# Patient Record
Sex: FEMALE | Race: BLACK OR AFRICAN AMERICAN | NOT HISPANIC OR LATINO | Employment: UNEMPLOYED | URBAN - METROPOLITAN AREA
[De-identification: names, ages, dates, MRNs, and addresses within clinical notes are randomized per-mention and may not be internally consistent; named-entity substitution may affect disease eponyms.]

---

## 2022-07-15 ENCOUNTER — HOSPITAL ENCOUNTER (EMERGENCY)
Facility: HOSPITAL | Age: 22
Discharge: HOME | End: 2022-07-15
Attending: EMERGENCY MEDICINE
Payer: MEDICAID

## 2022-07-15 VITALS
RESPIRATION RATE: 11 BRPM | OXYGEN SATURATION: 100 % | HEART RATE: 75 BPM | WEIGHT: 212.88 LBS | HEIGHT: 62 IN | TEMPERATURE: 100 F | DIASTOLIC BLOOD PRESSURE: 92 MMHG | SYSTOLIC BLOOD PRESSURE: 131 MMHG | BODY MASS INDEX: 39.17 KG/M2

## 2022-07-15 DIAGNOSIS — R55 SYNCOPE: ICD-10-CM

## 2022-07-15 DIAGNOSIS — R55 VASOVAGAL SYNCOPE: Primary | ICD-10-CM

## 2022-07-15 LAB
ALBUMIN SERPL BCP-MCNC: 4.1 G/DL (ref 3.5–5.2)
ALP SERPL-CCNC: 74 U/L (ref 55–135)
ALT SERPL W/O P-5'-P-CCNC: 44 U/L (ref 10–44)
AMPHET+METHAMPHET UR QL: NEGATIVE
ANION GAP SERPL CALC-SCNC: 15 MMOL/L (ref 8–16)
AST SERPL-CCNC: 16 U/L (ref 10–40)
B-HCG UR QL: NEGATIVE
BACTERIA #/AREA URNS AUTO: ABNORMAL /HPF
BARBITURATES UR QL SCN>200 NG/ML: NEGATIVE
BASOPHILS # BLD AUTO: 0.06 K/UL (ref 0–0.2)
BASOPHILS NFR BLD: 0.5 % (ref 0–1.9)
BENZODIAZ UR QL SCN>200 NG/ML: NEGATIVE
BILIRUB SERPL-MCNC: 0.6 MG/DL (ref 0.1–1)
BILIRUB UR QL STRIP: ABNORMAL
BUN SERPL-MCNC: 10 MG/DL (ref 6–20)
BZE UR QL SCN: NEGATIVE
CALCIUM SERPL-MCNC: 9.9 MG/DL (ref 8.7–10.5)
CANNABINOIDS UR QL SCN: ABNORMAL
CHLORIDE SERPL-SCNC: 102 MMOL/L (ref 95–110)
CLARITY UR REFRACT.AUTO: ABNORMAL
CO2 SERPL-SCNC: 21 MMOL/L (ref 23–29)
COLOR UR AUTO: ABNORMAL
CREAT SERPL-MCNC: 0.9 MG/DL (ref 0.5–1.4)
CREAT UR-MCNC: 141.9 MG/DL (ref 15–325)
CTP QC/QA: YES
CTP QC/QA: YES
DIFFERENTIAL METHOD: ABNORMAL
EOSINOPHIL # BLD AUTO: 0 K/UL (ref 0–0.5)
EOSINOPHIL NFR BLD: 0.3 % (ref 0–8)
ERYTHROCYTE [DISTWIDTH] IN BLOOD BY AUTOMATED COUNT: 16.8 % (ref 11.5–14.5)
EST. GFR  (AFRICAN AMERICAN): >60 ML/MIN/1.73 M^2
EST. GFR  (NON AFRICAN AMERICAN): >60 ML/MIN/1.73 M^2
GLUCOSE SERPL-MCNC: 95 MG/DL (ref 70–110)
GLUCOSE UR QL STRIP: NEGATIVE
HCT VFR BLD AUTO: 40.3 % (ref 37–48.5)
HGB BLD-MCNC: 12.8 G/DL (ref 12–16)
HGB UR QL STRIP: ABNORMAL
HYALINE CASTS UR QL AUTO: 0 /LPF
IMM GRANULOCYTES # BLD AUTO: 0.08 K/UL (ref 0–0.04)
IMM GRANULOCYTES NFR BLD AUTO: 0.7 % (ref 0–0.5)
KETONES UR QL STRIP: ABNORMAL
LEUKOCYTE ESTERASE UR QL STRIP: ABNORMAL
LIPASE SERPL-CCNC: 25 U/L (ref 4–60)
LYMPHOCYTES # BLD AUTO: 2.4 K/UL (ref 1–4.8)
LYMPHOCYTES NFR BLD: 21 % (ref 18–48)
MCH RBC QN AUTO: 24.8 PG (ref 27–31)
MCHC RBC AUTO-ENTMCNC: 31.8 G/DL (ref 32–36)
MCV RBC AUTO: 78 FL (ref 82–98)
METHADONE UR QL SCN>300 NG/ML: NEGATIVE
MICROSCOPIC COMMENT: ABNORMAL
MONOCYTES # BLD AUTO: 0.9 K/UL (ref 0.3–1)
MONOCYTES NFR BLD: 8.1 % (ref 4–15)
NEUTROPHILS # BLD AUTO: 7.9 K/UL (ref 1.8–7.7)
NEUTROPHILS NFR BLD: 69.4 % (ref 38–73)
NITRITE UR QL STRIP: NEGATIVE
NRBC BLD-RTO: 0 /100 WBC
OPIATES UR QL SCN: NEGATIVE
PCP UR QL SCN>25 NG/ML: NEGATIVE
PH UR STRIP: 6 [PH] (ref 5–8)
PLATELET # BLD AUTO: 605 K/UL (ref 150–450)
PMV BLD AUTO: 8.8 FL (ref 9.2–12.9)
POC MOLECULAR INFLUENZA A AGN: NEGATIVE
POC MOLECULAR INFLUENZA B AGN: NEGATIVE
POTASSIUM SERPL-SCNC: 3.2 MMOL/L (ref 3.5–5.1)
PROT SERPL-MCNC: 8.9 G/DL (ref 6–8.4)
PROT UR QL STRIP: ABNORMAL
RBC # BLD AUTO: 5.16 M/UL (ref 4–5.4)
RBC #/AREA URNS AUTO: >100 /HPF (ref 0–4)
SARS-COV-2 RDRP RESP QL NAA+PROBE: NEGATIVE
SODIUM SERPL-SCNC: 138 MMOL/L (ref 136–145)
SP GR UR STRIP: 1.01 (ref 1–1.03)
SQUAMOUS #/AREA URNS AUTO: 2 /HPF
TOXICOLOGY INFORMATION: ABNORMAL
TROPONIN I SERPL DL<=0.01 NG/ML-MCNC: 0.01 NG/ML (ref 0–0.03)
URN SPEC COLLECT METH UR: ABNORMAL
UROBILINOGEN UR STRIP-ACNC: NEGATIVE EU/DL
WBC # BLD AUTO: 11.45 K/UL (ref 3.9–12.7)
WBC #/AREA URNS AUTO: 12 /HPF (ref 0–5)

## 2022-07-15 PROCEDURE — 87086 URINE CULTURE/COLONY COUNT: CPT | Performed by: NURSE PRACTITIONER

## 2022-07-15 PROCEDURE — 93010 EKG 12-LEAD: ICD-10-PCS | Mod: ,,, | Performed by: INTERNAL MEDICINE

## 2022-07-15 PROCEDURE — 80053 COMPREHEN METABOLIC PANEL: CPT | Mod: ER | Performed by: NURSE PRACTITIONER

## 2022-07-15 PROCEDURE — 93010 ELECTROCARDIOGRAM REPORT: CPT | Mod: ,,, | Performed by: INTERNAL MEDICINE

## 2022-07-15 PROCEDURE — 87502 INFLUENZA DNA AMP PROBE: CPT | Mod: ER

## 2022-07-15 PROCEDURE — 80307 DRUG TEST PRSMV CHEM ANLYZR: CPT | Mod: ER | Performed by: NURSE PRACTITIONER

## 2022-07-15 PROCEDURE — 81025 URINE PREGNANCY TEST: CPT | Mod: ER | Performed by: NURSE PRACTITIONER

## 2022-07-15 PROCEDURE — 84484 ASSAY OF TROPONIN QUANT: CPT | Mod: ER | Performed by: NURSE PRACTITIONER

## 2022-07-15 PROCEDURE — 25000003 PHARM REV CODE 250: Mod: ER | Performed by: NURSE PRACTITIONER

## 2022-07-15 PROCEDURE — 83690 ASSAY OF LIPASE: CPT | Mod: ER | Performed by: NURSE PRACTITIONER

## 2022-07-15 PROCEDURE — U0002 COVID-19 LAB TEST NON-CDC: HCPCS | Mod: ER | Performed by: NURSE PRACTITIONER

## 2022-07-15 PROCEDURE — 93005 ELECTROCARDIOGRAM TRACING: CPT | Mod: ER

## 2022-07-15 PROCEDURE — 85025 COMPLETE CBC W/AUTO DIFF WBC: CPT | Mod: ER | Performed by: NURSE PRACTITIONER

## 2022-07-15 PROCEDURE — 81000 URINALYSIS NONAUTO W/SCOPE: CPT | Mod: ER | Performed by: NURSE PRACTITIONER

## 2022-07-15 PROCEDURE — 99285 EMERGENCY DEPT VISIT HI MDM: CPT | Mod: 25,ER

## 2022-07-15 PROCEDURE — 82962 GLUCOSE BLOOD TEST: CPT | Mod: ER

## 2022-07-15 RX ORDER — POTASSIUM CHLORIDE 20 MEQ/1
40 TABLET, EXTENDED RELEASE ORAL
Status: COMPLETED | OUTPATIENT
Start: 2022-07-15 | End: 2022-07-15

## 2022-07-15 RX ORDER — ONDANSETRON 8 MG/1
8 TABLET, ORALLY DISINTEGRATING ORAL EVERY 8 HOURS PRN
COMMUNITY
Start: 2022-07-11 | End: 2023-05-13

## 2022-07-15 RX ORDER — IBUPROFEN 200 MG
800 TABLET ORAL
Status: COMPLETED | OUTPATIENT
Start: 2022-07-15 | End: 2022-07-15

## 2022-07-15 RX ORDER — PROMETHAZINE HYDROCHLORIDE 25 MG/1
25 TABLET ORAL EVERY 6 HOURS PRN
COMMUNITY
Start: 2022-07-11 | End: 2023-05-13

## 2022-07-15 RX ADMIN — POTASSIUM CHLORIDE 40 MEQ: 1500 TABLET, EXTENDED RELEASE ORAL at 07:07

## 2022-07-15 RX ADMIN — IBUPROFEN 800 MG: 200 TABLET, FILM COATED ORAL at 06:07

## 2022-07-15 NOTE — ED PROVIDER NOTES
Encounter Date: 7/15/2022       History     Chief Complaint   Patient presents with    Loss of Consciousness     Felt overheated, blurry vision, passed out for 30 seconds. Hasn't been eating much recently due to stomach bug     Patient complains of nausea vomiting and weakness for 1 week states that tonight she was taking a shower and her vision became blurry and she passed out for 30 seconds.        Review of patient's allergies indicates:   Allergen Reactions    Shrimp      History reviewed. No pertinent past medical history.  History reviewed. No pertinent surgical history.  History reviewed. No pertinent family history.  Social History     Tobacco Use    Smoking status: Never Smoker    Smokeless tobacco: Never Used   Substance Use Topics    Alcohol use: Yes     Comment: occassionally    Drug use: Never     Review of Systems   Constitutional: Negative for fever.   HENT: Negative for sore throat.    Respiratory: Negative for shortness of breath.    Cardiovascular: Negative for chest pain.   Gastrointestinal: Negative for nausea.   Genitourinary: Negative for dysuria.   Musculoskeletal: Negative for back pain.   Skin: Negative for rash.   Neurological: Negative for weakness.   Hematological: Does not bruise/bleed easily.       Physical Exam     Initial Vitals [07/15/22 1658]   BP Pulse Resp Temp SpO2   132/87 93 16 99.8 °F (37.7 °C) 98 %      MAP       --         Physical Exam    Nursing note and vitals reviewed.  Constitutional: She appears well-developed and well-nourished. She is not diaphoretic. She is active.  Non-toxic appearance. No distress.   HENT:   Head: Normocephalic and atraumatic.   Eyes: Conjunctivae are normal. Right eye exhibits no discharge. Left eye exhibits no discharge. No scleral icterus.   Neck:   Normal range of motion.  Cardiovascular: Normal rate, regular rhythm and intact distal pulses.   No murmur heard.  Pulmonary/Chest: Breath sounds normal. No respiratory distress. She has no  wheezes.   Abdominal: She exhibits no distension.   Musculoskeletal:         General: No tenderness. Normal range of motion.      Cervical back: Normal range of motion.     Neurological: She is alert and oriented to person, place, and time. No cranial nerve deficit. GCS score is 15. GCS eye subscore is 4. GCS verbal subscore is 5. GCS motor subscore is 6.   Skin: Skin is warm and dry. Capillary refill takes less than 2 seconds. No rash noted.   Psychiatric: She has a normal mood and affect. Her behavior is normal. Judgment and thought content normal.         ED Course   Procedures  Labs Reviewed   CBC W/ AUTO DIFFERENTIAL - Abnormal; Notable for the following components:       Result Value    MCV 78 (*)     MCH 24.8 (*)     MCHC 31.8 (*)     RDW 16.8 (*)     Platelets 605 (*)     MPV 8.8 (*)     Immature Granulocytes 0.7 (*)     Gran # (ANC) 7.9 (*)     Immature Grans (Abs) 0.08 (*)     All other components within normal limits   COMPREHENSIVE METABOLIC PANEL - Abnormal; Notable for the following components:    Potassium 3.2 (*)     CO2 21 (*)     Total Protein 8.9 (*)     All other components within normal limits   URINALYSIS, REFLEX TO URINE CULTURE - Abnormal; Notable for the following components:    Color, UA Red (*)     Appearance, UA Cloudy (*)     Protein, UA 2+ (*)     Ketones, UA 1+ (*)     Bilirubin (UA) 1+ (*)     Occult Blood UA 3+ (*)     Leukocytes, UA 2+ (*)     All other components within normal limits    Narrative:     Specimen Source->Urine   DRUG SCREEN PANEL, URINE EMERGENCY - Abnormal; Notable for the following components:    THC Presumptive Positive (*)     All other components within normal limits    Narrative:     Specimen Source->Urine   URINALYSIS MICROSCOPIC - Abnormal; Notable for the following components:    RBC, UA >100 (*)     WBC, UA 12 (*)     All other components within normal limits    Narrative:     Specimen Source->Urine   CULTURE, URINE   TROPONIN I   PREGNANCY TEST, URINE RAPID     Narrative:     Specimen Source->Urine   LIPASE   SARS-COV-2 RDRP GENE    Narrative:     This test utilizes isothermal nucleic acid amplification   technology to detect the SARS-CoV-2 RdRp nucleic acid segment.   The analytical sensitivity (limit of detection) is 125 genome   equivalents/mL.   A POSITIVE result implies infection with the SARS-CoV-2 virus;   the patient is presumed to be contagious.     A NEGATIVE result means that SARS-CoV-2 nucleic acids are not   present above the limit of detection. A NEGATIVE result should be   treated as presumptive. It does not rule out the possibility of   COVID-19 and should not be the sole basis for treatment decisions.   If COVID-19 is strongly suspected based on clinical and exposure   history, re-testing using an alternate molecular assay should be   considered.   This test is only for use under the Food and Drug   Administration s Emergency Use Authorization (EUA).   Commercial kits are provided by [employer].   Performance characteristics of the EUA have been independently   verified by [facility] Department of   Pathology and Laboratory Medicine.   _________________________________________________________________   The authorized Fact Sheet for Healthcare Providers and the authorized Fact   Sheet for Patients of the ID NOW COVID-19 are available on the FDA   website:     https://www.fda.gov/media/983274/download  https://www.fda.gov/media/601303/download           POCT INFLUENZA A/B MOLECULAR     EKG Readings: (Independently Interpreted)   Initial Reading: No STEMI. Rhythm: Normal Sinus Rhythm. Heart Rate: 81. Ectopy: No Ectopy. Conduction: Normal. ST Segments: Normal ST Segments. T Waves: Normal. Clinical Impression: Normal Sinus Rhythm       Imaging Results          X-Ray Chest AP Portable (Final result)  Result time 07/15/22 20:05:35    Final result by Brandon Maldonado MD (07/15/22 20:05:35)                 Impression:      No acute  abnormality.      Electronically signed by: Brandon Maldonado  Date:    07/15/2022  Time:    20:05             Narrative:    EXAMINATION:  XR CHEST AP PORTABLE    CLINICAL HISTORY:  syncope;    TECHNIQUE:  Single frontal view of the chest was performed.    COMPARISON:  None    FINDINGS:  The lungs are clear, with normal appearance of pulmonary vasculature and no pleural effusion or pneumothorax.    The cardiac silhouette is normal in size. The hilar and mediastinal contours are unremarkable.    Bones are intact.                                 Medications   ibuprofen tablet 800 mg (800 mg Oral Given 7/15/22 1854)   potassium chloride SA CR tablet 40 mEq (40 mEq Oral Given 7/15/22 1912)                          Clinical Impression:   Final diagnoses:  [R55] Syncope  [R55] Vasovagal syncope (Primary)          ED Disposition Condition    Discharge Stable        ED Prescriptions     None        Follow-up Information     Follow up With Specialties Details Why Contact Info Additional Information    [facility] Schedule an appointment as soon as possible for a visit  As needed [id number] [facility] [id number] [phone] Please park on the [address] side and use the Clinic entrance. Check in on the 3rd floor, to the right.           Dino Ceballos NP  07/15/22 2013

## 2022-07-16 LAB — POCT GLUCOSE: 90 MG/DL (ref 70–110)

## 2022-07-17 LAB
BACTERIA UR CULT: NORMAL
BACTERIA UR CULT: NORMAL

## 2022-08-14 ENCOUNTER — HOSPITAL ENCOUNTER (EMERGENCY)
Facility: HOSPITAL | Age: 22
Discharge: HOME | End: 2022-08-14
Attending: EMERGENCY MEDICINE
Payer: MEDICAID

## 2022-08-14 VITALS
WEIGHT: 218 LBS | TEMPERATURE: 98 F | HEART RATE: 81 BPM | BODY MASS INDEX: 39.87 KG/M2 | DIASTOLIC BLOOD PRESSURE: 81 MMHG | RESPIRATION RATE: 18 BRPM | SYSTOLIC BLOOD PRESSURE: 141 MMHG | OXYGEN SATURATION: 100 %

## 2022-08-14 DIAGNOSIS — R11.2 NON-INTRACTABLE VOMITING WITH NAUSEA, UNSPECIFIED VOMITING TYPE: Primary | ICD-10-CM

## 2022-08-14 DIAGNOSIS — B34.9 NONSPECIFIC SYNDROME SUGGESTIVE OF VIRAL ILLNESS: ICD-10-CM

## 2022-08-14 DIAGNOSIS — R10.13 ACUTE EPIGASTRIC PAIN: ICD-10-CM

## 2022-08-14 LAB
ALBUMIN SERPL BCP-MCNC: 3.8 G/DL (ref 3.5–5.2)
ALP SERPL-CCNC: 67 U/L (ref 55–135)
ALT SERPL W/O P-5'-P-CCNC: 12 U/L (ref 10–44)
ANION GAP SERPL CALC-SCNC: 12 MMOL/L (ref 8–16)
AST SERPL-CCNC: 12 U/L (ref 10–40)
B-HCG UR QL: NEGATIVE
BACTERIA #/AREA URNS AUTO: ABNORMAL /HPF
BASOPHILS # BLD AUTO: 0.04 K/UL (ref 0–0.2)
BASOPHILS NFR BLD: 0.5 % (ref 0–1.9)
BILIRUB SERPL-MCNC: 0.2 MG/DL (ref 0.1–1)
BILIRUB UR QL STRIP: NEGATIVE
BUN SERPL-MCNC: 7 MG/DL (ref 6–20)
CALCIUM SERPL-MCNC: 9.7 MG/DL (ref 8.7–10.5)
CHLORIDE SERPL-SCNC: 109 MMOL/L (ref 95–110)
CLARITY UR REFRACT.AUTO: ABNORMAL
CO2 SERPL-SCNC: 20 MMOL/L (ref 23–29)
COLOR UR AUTO: ABNORMAL
CREAT SERPL-MCNC: 0.7 MG/DL (ref 0.5–1.4)
DIFFERENTIAL METHOD: ABNORMAL
EOSINOPHIL # BLD AUTO: 0.1 K/UL (ref 0–0.5)
EOSINOPHIL NFR BLD: 1.5 % (ref 0–8)
ERYTHROCYTE [DISTWIDTH] IN BLOOD BY AUTOMATED COUNT: 16.4 % (ref 11.5–14.5)
EST. GFR  (NO RACE VARIABLE): >60 ML/MIN/1.73 M^2
GLUCOSE SERPL-MCNC: 134 MG/DL (ref 70–110)
GLUCOSE UR QL STRIP: NEGATIVE
HCT VFR BLD AUTO: 37 % (ref 37–48.5)
HGB BLD-MCNC: 11.6 G/DL (ref 12–16)
HGB UR QL STRIP: ABNORMAL
HYALINE CASTS UR QL AUTO: 0 /LPF
IMM GRANULOCYTES # BLD AUTO: 0.03 K/UL (ref 0–0.04)
IMM GRANULOCYTES NFR BLD AUTO: 0.4 % (ref 0–0.5)
KETONES UR QL STRIP: NEGATIVE
LEUKOCYTE ESTERASE UR QL STRIP: ABNORMAL
LIPASE SERPL-CCNC: 30 U/L (ref 4–60)
LYMPHOCYTES # BLD AUTO: 1.5 K/UL (ref 1–4.8)
LYMPHOCYTES NFR BLD: 18.8 % (ref 18–48)
MCH RBC QN AUTO: 25.2 PG (ref 27–31)
MCHC RBC AUTO-ENTMCNC: 31.4 G/DL (ref 32–36)
MCV RBC AUTO: 80 FL (ref 82–98)
MICROSCOPIC COMMENT: ABNORMAL
MONOCYTES # BLD AUTO: 0.5 K/UL (ref 0.3–1)
MONOCYTES NFR BLD: 5.8 % (ref 4–15)
NEUTROPHILS # BLD AUTO: 5.8 K/UL (ref 1.8–7.7)
NEUTROPHILS NFR BLD: 73 % (ref 38–73)
NITRITE UR QL STRIP: NEGATIVE
NRBC BLD-RTO: 0 /100 WBC
PH UR STRIP: 7 [PH] (ref 5–8)
PLATELET # BLD AUTO: 630 K/UL (ref 150–450)
PMV BLD AUTO: 8.9 FL (ref 9.2–12.9)
POTASSIUM SERPL-SCNC: 3.3 MMOL/L (ref 3.5–5.1)
PROT SERPL-MCNC: 8.3 G/DL (ref 6–8.4)
PROT UR QL STRIP: ABNORMAL
RBC # BLD AUTO: 4.6 M/UL (ref 4–5.4)
RBC #/AREA URNS AUTO: >100 /HPF (ref 0–4)
SODIUM SERPL-SCNC: 141 MMOL/L (ref 136–145)
SP GR UR STRIP: 1.01 (ref 1–1.03)
SQUAMOUS #/AREA URNS AUTO: 3 /HPF
URN SPEC COLLECT METH UR: ABNORMAL
UROBILINOGEN UR STRIP-ACNC: NEGATIVE EU/DL
WBC # BLD AUTO: 7.97 K/UL (ref 3.9–12.7)
WBC #/AREA URNS AUTO: 1 /HPF (ref 0–5)

## 2022-08-14 PROCEDURE — 25000003 PHARM REV CODE 250: Mod: ER | Performed by: NURSE PRACTITIONER

## 2022-08-14 PROCEDURE — 96365 THER/PROPH/DIAG IV INF INIT: CPT | Mod: ER

## 2022-08-14 PROCEDURE — 99285 EMERGENCY DEPT VISIT HI MDM: CPT | Mod: 25,ER

## 2022-08-14 PROCEDURE — 63600175 PHARM REV CODE 636 W HCPCS: Mod: ER | Performed by: NURSE PRACTITIONER

## 2022-08-14 PROCEDURE — 96375 TX/PRO/DX INJ NEW DRUG ADDON: CPT | Mod: ER

## 2022-08-14 PROCEDURE — 85025 COMPLETE CBC W/AUTO DIFF WBC: CPT | Mod: ER | Performed by: NURSE PRACTITIONER

## 2022-08-14 PROCEDURE — 83690 ASSAY OF LIPASE: CPT | Performed by: NURSE PRACTITIONER

## 2022-08-14 PROCEDURE — 80053 COMPREHEN METABOLIC PANEL: CPT | Performed by: NURSE PRACTITIONER

## 2022-08-14 PROCEDURE — 81025 URINE PREGNANCY TEST: CPT | Mod: ER | Performed by: NURSE PRACTITIONER

## 2022-08-14 PROCEDURE — 81000 URINALYSIS NONAUTO W/SCOPE: CPT | Mod: ER | Performed by: NURSE PRACTITIONER

## 2022-08-14 RX ORDER — METOCLOPRAMIDE HYDROCHLORIDE 5 MG/ML
10 INJECTION INTRAMUSCULAR; INTRAVENOUS
Status: COMPLETED | OUTPATIENT
Start: 2022-08-14 | End: 2022-08-14

## 2022-08-14 RX ORDER — ACETAMINOPHEN 500 MG
1000 TABLET ORAL EVERY 6 HOURS PRN
Qty: 40 TABLET | Refills: 0 | Status: SHIPPED | OUTPATIENT
Start: 2022-08-14 | End: 2022-09-13

## 2022-08-14 RX ORDER — DIPHENHYDRAMINE HYDROCHLORIDE 50 MG/ML
12.5 INJECTION INTRAMUSCULAR; INTRAVENOUS
Status: COMPLETED | OUTPATIENT
Start: 2022-08-14 | End: 2022-08-14

## 2022-08-14 RX ORDER — ONDANSETRON 4 MG/1
4 TABLET, ORALLY DISINTEGRATING ORAL EVERY 8 HOURS PRN
Qty: 18 TABLET | Refills: 0 | OUTPATIENT
Start: 2022-08-14 | End: 2023-05-13

## 2022-08-14 RX ORDER — KETOROLAC TROMETHAMINE 30 MG/ML
15 INJECTION, SOLUTION INTRAMUSCULAR; INTRAVENOUS
Status: COMPLETED | OUTPATIENT
Start: 2022-08-14 | End: 2022-08-14

## 2022-08-14 RX ADMIN — PROMETHAZINE HYDROCHLORIDE 12.5 MG: 25 INJECTION INTRAMUSCULAR; INTRAVENOUS at 05:08

## 2022-08-14 RX ADMIN — METOCLOPRAMIDE 10 MG: 5 INJECTION, SOLUTION INTRAMUSCULAR; INTRAVENOUS at 06:08

## 2022-08-14 RX ADMIN — KETOROLAC TROMETHAMINE 15 MG: 30 INJECTION, SOLUTION INTRAMUSCULAR; INTRAVENOUS at 07:08

## 2022-08-14 RX ADMIN — DIPHENHYDRAMINE HYDROCHLORIDE 12.5 MG: 50 INJECTION, SOLUTION INTRAMUSCULAR; INTRAVENOUS at 06:08

## 2022-08-14 RX ADMIN — SODIUM CHLORIDE 1000 ML: 0.9 INJECTION, SOLUTION INTRAVENOUS at 05:08

## 2022-08-15 NOTE — ED PROVIDER NOTES
HISTORY     Chief Complaint   Patient presents with    Abdominal Pain     Abdominal pain and vomiting began around 3pm today     Review of patient's allergies indicates:   Allergen Reactions    Shrimp         HPI   The history is provided by the patient. No  was used.   Abdominal Pain  The current episode started today. The onset of the illness was gradual. The problem has not changed since onset.Pain location: Mid upper abdomen. The severity of the abdominal pain is 4/10. The other symptoms of the illness include nausea, vomiting and vaginal bleeding (Reports recently started cycle). The other symptoms of the illness do not include fever, fatigue, jaundice, shortness of breath, diarrhea, dysuria, hematemesis, hematochezia or vaginal discharge.   Symptoms associated with the illness do not include back pain.        PCP: Primary Doctor No     Past Medical History:  History reviewed. No pertinent past medical history.     Past Surgical History:  History reviewed. No pertinent surgical history.     Family History:  History reviewed. No pertinent family history.     Social History:  Social History     Tobacco Use    Smoking status: Never Smoker    Smokeless tobacco: Never Used   Substance and Sexual Activity    Alcohol use: Yes     Comment: occassionally    Drug use: Never    Sexual activity: Not on file         ROS   Review of Systems   Constitutional: Negative for fatigue and fever.   HENT: Negative for sore throat.    Respiratory: Negative for shortness of breath.    Cardiovascular: Negative for chest pain.   Gastrointestinal: Positive for abdominal pain, nausea and vomiting. Negative for diarrhea, hematemesis, hematochezia and jaundice.   Genitourinary: Positive for vaginal bleeding (Reports recently started cycle). Negative for dysuria and vaginal discharge.   Musculoskeletal: Negative for back pain.   Skin: Negative for rash.   Neurological: Negative for weakness.   Hematological:  Does not bruise/bleed easily.       PHYSICAL EXAM     Initial Vitals [08/14/22 1711]   BP Pulse Resp Temp SpO2   (!) 168/103 77 16 97.7 °F (36.5 °C) 98 %      MAP       --           Physical Exam    Nursing note and vitals reviewed.  Constitutional: She appears well-developed and well-nourished. She is not diaphoretic. No distress.   HENT:   Head: Normocephalic and atraumatic.   Eyes: Right eye exhibits no discharge. Left eye exhibits no discharge.   Neck: Neck supple.   Normal range of motion.  Cardiovascular: Normal rate.   Pulmonary/Chest: No respiratory distress.   Abdominal: Abdomen is soft. She exhibits no distension. There is abdominal tenderness (Mid upper abdomen).   Musculoskeletal:         General: Normal range of motion.      Cervical back: Normal range of motion and neck supple.     Neurological: She is alert and oriented to person, place, and time. She has normal strength.   Skin: Skin is warm and dry.   Psychiatric: She has a normal mood and affect. Her behavior is normal. Thought content normal.          ED COURSE   Procedures  ED ONGOING VITALS:  Vitals:    08/14/22 1711 08/14/22 1837 08/14/22 2019   BP: (!) 168/103 (!) 159/89 (!) 141/81   Pulse: 77 78 81   Resp: 16 19 18   Temp: 97.7 °F (36.5 °C)     TempSrc: Oral     SpO2: 98% 100% 100%   Weight: 98.9 kg (218 lb)           ABNORMAL LAB VALUES:  Labs Reviewed   CBC W/ AUTO DIFFERENTIAL - Abnormal; Notable for the following components:       Result Value    Hemoglobin 11.6 (*)     MCV 80 (*)     MCH 25.2 (*)     MCHC 31.4 (*)     RDW 16.4 (*)     Platelets 630 (*)     MPV 8.9 (*)     All other components within normal limits   COMPREHENSIVE METABOLIC PANEL - Abnormal; Notable for the following components:    Potassium 3.3 (*)     CO2 20 (*)     Glucose 134 (*)     All other components within normal limits   URINALYSIS, REFLEX TO URINE CULTURE - Abnormal; Notable for the following components:    Color, UA Red (*)     Appearance, UA Hazy (*)      Protein, UA 1+ (*)     Occult Blood UA 3+ (*)     Leukocytes, UA Trace (*)     All other components within normal limits    Narrative:     Specimen Source->Urine   URINALYSIS MICROSCOPIC - Abnormal; Notable for the following components:    RBC, UA >100 (*)     All other components within normal limits    Narrative:     Specimen Source->Urine   LIPASE   PREGNANCY TEST, URINE RAPID    Narrative:     Specimen Source->Urine         ALL LAB VALUES:        RADIOLOGY STUDIES:  Imaging Results          CT Abdomen Pelvis  Without Contrast (Final result)  Result time 08/14/22 18:25:33    Final result by Brandon Maldonado MD (08/14/22 18:25:33)                 Impression:      Questionable minimal peripancreatic inflammatory stranding.  Correlation lipase.    No definite acute abnormality identified otherwise.  Correlation to history and further evaluation as warranted.    All CT scans at this facility are performed  using dose modulation techniques as appropriate to performed exam including the following:  automated exposure control; adjustment of mA and/or kV according to the patients size (this includes techniques or standardized protocols for targeted exams where dose is matched to indication/reason for exam: i.e. extremities or head);  iterative reconstruction technique.      Electronically signed by: Brandon Maldonado  Date:    08/14/2022  Time:    18:25             Narrative:    EXAMINATION:  CT ABDOMEN PELVIS WITHOUT CONTRAST    CLINICAL HISTORY:  upper abdominal pain and tenderness;    TECHNIQUE:  Low dose axial images, sagittal and coronal reformations were obtained from the lung bases to the pubic symphysis.  Contrast was not administered.    COMPARISON:  None    FINDINGS:  Heart: Normal in size. No pericardial effusion.    Lung Bases: Well aerated, without consolidation or pleural fluid.    Liver: Normal in size and attenuation, with no focal hepatic lesions.    Gallbladder: No calcified gallstones.    Bile Ducts: No  evidence of dilated ducts.    Pancreas: Questionable minimal peripancreatic inflammatory stranding.  Correlation lipase recommended.    Spleen: Unremarkable.    Adrenals: Unremarkable.    Kidneys/ Ureters: Unremarkable.    Bladder: No evidence of wall thickening.    Reproductive organs: Unremarkable.    GI Tract/Mesentery: No evidence of bowel obstruction or inflammation.  No evidence of appendicitis.    Peritoneal Space: No ascites. No free air.    Retroperitoneum: No significant adenopathy.    Abdominal wall: Unremarkable.    Vasculature: No significant atherosclerosis or aneurysm.    Bones: No acute fracture.                                          The above vital signs and test results have been reviewed by the emergency provider.     ED Medications:  Current Discharge Medication List        Discharge Medications:  New Prescriptions    ACETAMINOPHEN (TYLENOL) 500 MG TABLET    Take 2 tablets (1,000 mg total) by mouth every 6 (six) hours as needed for Pain. Do not exceed 6 tablets within 24 hours.    ONDANSETRON (ZOFRAN-ODT) 4 MG TBDL    Take 1 tablet (4 mg total) by mouth every 8 (eight) hours as needed (nausea/vomiting).       Follow-up Information     Schedule an appointment as soon as possible for a visit  with with your primary care physician.           [facility] Emergency Dept.    Specialty: Emergency Medicine  Why: As needed, If symptoms worsen  Contact information:  [address]  [facility] [address] [phone]                      I discussed with patient and/or family/caretaker that evaluation in the ED does not suggest any emergent or life threatening medical conditions requiring immediate intervention beyond what was provided in the ED, and I believe patient is safe for discharge. Regardless, an unremarkable evaluation in the ED does not preclude the development or presence of a serious or life threatening condition. As such, patient was instructed to return immediately for any  worsening or change in current symptoms.    Pre-hypertension/Hypertension: The pt has been informed that they may have pre-hypertension or hypertension based on a blood pressure reading in the ED. I recommend that the pt call the PCP listed on their discharge instructions or a physician of their choice this week to arrange f/u for further evaluation of possible pre-hypertension or hypertension.       MEDICAL DECISION MAKING                 CLINICAL IMPRESSION       ICD-10-CM ICD-9-CM   1. Non-intractable vomiting with nausea, unspecified vomiting type  R11.2 787.01   2. Nonspecific syndrome suggestive of viral illness  B34.9 079.99   3. Acute epigastric pain  R10.13 789.06     338.19               Bryan Sparrow MD  08/14/22 2025

## 2023-05-13 ENCOUNTER — HOSPITAL ENCOUNTER (EMERGENCY)
Facility: HOSPITAL | Age: 23
Discharge: HOME | End: 2023-05-13
Attending: EMERGENCY MEDICINE
Payer: MEDICAID

## 2023-05-13 VITALS
HEART RATE: 78 BPM | DIASTOLIC BLOOD PRESSURE: 74 MMHG | WEIGHT: 201 LBS | TEMPERATURE: 99 F | RESPIRATION RATE: 20 BRPM | OXYGEN SATURATION: 99 % | SYSTOLIC BLOOD PRESSURE: 121 MMHG | BODY MASS INDEX: 36.76 KG/M2

## 2023-05-13 DIAGNOSIS — S93.401A RIGHT ANKLE SPRAIN: ICD-10-CM

## 2023-05-13 LAB — B-HCG UR QL: NEGATIVE

## 2023-05-13 PROCEDURE — 99283 EMERGENCY DEPT VISIT LOW MDM: CPT | Mod: ER

## 2023-05-13 PROCEDURE — 81025 URINE PREGNANCY TEST: CPT | Mod: ER | Performed by: EMERGENCY MEDICINE

## 2023-05-13 RX ORDER — NAPROXEN 500 MG/1
500 TABLET ORAL 2 TIMES DAILY WITH MEALS
Qty: 20 TABLET | Refills: 0 | Status: SHIPPED | OUTPATIENT
Start: 2023-05-13 | End: 2024-01-18

## 2023-05-13 RX ORDER — PANTOPRAZOLE SODIUM 40 MG/1
40 TABLET, DELAYED RELEASE ORAL EVERY MORNING
COMMUNITY
Start: 2023-04-18

## 2023-05-17 NOTE — ED PROVIDER NOTES
ED Provider Note - 5/13/2023    History     Chief Complaint   Patient presents with    Ankle Injury     Right ankle, injured walking up steps     Patient currently presents with a chief complaint of injury to the right ankle.  This was acquired just PTA as a result of twisting the ankle while walking up steps.  Patient notes associated symptoms of pain and swelling.  Denies associated deformity, abrasion, laceration, and loss of ROM.    Review of patient's allergies indicates:   Allergen Reactions    Shrimp      No past medical history on file.  No past surgical history on file.  No family history on file.  Social History     Tobacco Use    Smoking status: Never    Smokeless tobacco: Never   Substance Use Topics    Alcohol use: Yes     Comment: occassionally    Drug use: Never     Review of Systems   Constitutional:  Negative for chills and fever.   HENT:  Negative for congestion and rhinorrhea.    Respiratory:  Negative for cough and shortness of breath.    Cardiovascular:  Negative for chest pain and palpitations.   Gastrointestinal:  Negative for abdominal pain, diarrhea and vomiting.   Genitourinary:  Negative for difficulty urinating and dysuria.   Skin:  Negative for color change and rash.   Neurological:  Negative for dizziness and light-headedness.   Hematological:  Negative for adenopathy. Does not bruise/bleed easily.     Physical Exam     Initial Vitals [05/13/23 1845]   BP Pulse Resp Temp SpO2   121/74 78 20 98.8 °F (37.1 °C) 99 %      MAP       --         Physical Exam    Nursing note and vitals reviewed.  Constitutional: She appears well-developed and well-nourished. She is not diaphoretic. No distress.   HENT:   Head: Normocephalic and atraumatic.   Nose: Nose normal.   Mouth/Throat: Oropharynx is clear and moist.   Eyes: Conjunctivae are normal. No scleral icterus.   Cardiovascular:  Normal rate, regular rhythm, normal heart sounds and intact distal pulses.           Pulmonary/Chest: Breath sounds  normal. No respiratory distress.   Abdominal: Abdomen is soft. She exhibits no distension. There is no abdominal tenderness.   Musculoskeletal:         General: No edema. Normal range of motion.      Right ankle: Swelling present. No deformity. Tenderness present. Normal range of motion. Normal pulse.      Right Achilles Tendon: Normal.     Neurological: She is alert and oriented to person, place, and time. She has normal strength.   Skin: Skin is warm and dry.       ED Course   Procedures                   MDM  Differential Diagnoses   Based on available history, the working differential diagnoses considered during this evaluation include but are not limited to sprain/strain, contusion, fracture, dislocation.      LABS   Labs Reviewed - No data to display      Imaging     Imaging Results              X-Ray Ankle Complete Right (Final result)  Result time 05/13/23 19:56:07      Final result by Van Duarte MD (05/13/23 19:56:07)                   Impression:      No acute fracture or dislocation      Electronically signed by: Van Duarte  Date:    05/13/2023  Time:    19:56               Narrative:    EXAMINATION:  XR ANKLE COMPLETE 3 VIEW RIGHT    CLINICAL HISTORY:  Sprain of unspecified ligament of right ankle, initial encounter    TECHNIQUE:  AP, lateral, and oblique images of the right ankle were performed.    COMPARISON:  None    FINDINGS:  No acute fracture or dislocation.  Normal joint spaces.  Mild ankle soft tissue swelling.  Punctate density at the calcaneal navicular junction may relate to degenerative joint disease.                                    X-Rays:   Independently Interpreted Readings:   Other Readings:  X-ray of the left ankle shows no evidence for fracture or dislocation    EKG        ED Management/Discussion   Medications - No data to display                The patient's list of active medical problems, social history, medications, and allergies as documented per RN staff has been  reviewed.                 On final assessment, the patient appears well and comfortable for discharge.  I see no indication of an emergent process beyond that addressed during our encounter but have duly counseled the patient/family regarding the need for prompt follow-up as well as the indications that should prompt immediate return to the emergency room should new or worrisome developments occur.  The patient/family has been provided with verbal and printed direction regarding our final diagnosis(es) as well as instructions regarding use of OTC and/or Rx medications intended to manage the patient's aforementioned conditions including:  ED Prescriptions       Medication Sig Dispense Start Date End Date Auth. Provider    naproxen (NAPROSYN) 500 MG tablet Take 1 tablet (500 mg total) by mouth 2 (two) times daily with meals. 20 tablet 5/13/2023 -- Jag Larios MD              Patient has been advised of the following recommended follow-up instructions:  Follow-up Information       Follow up With Specialties Details Why Contact Info    PCP  Schedule an appointment as soon as possible for a visit  for reassessment     [place] - Emergency Dept Emergency Medicine Go to  As needed, If symptoms worsen [address] [address] [id number] [phone]          The patient/family communicates understanding of all this information and all remaining questions and concerns were addressed at this time.      Referrals:  No orders of the defined types were placed in this encounter.      CLINICAL IMPRESSION    ICD-10-CM ICD-9-CM   1. Right ankle sprain  S93.401A 845.00          ED Disposition Condition    Discharge Stable               Jag Larios MD  05/17/23 3639

## 2023-09-10 ENCOUNTER — HOSPITAL ENCOUNTER (EMERGENCY)
Facility: HOSPITAL | Age: 23
Discharge: HOME | End: 2023-09-11
Attending: EMERGENCY MEDICINE
Payer: MEDICAID

## 2023-09-10 DIAGNOSIS — R10.13 EPIGASTRIC PAIN: ICD-10-CM

## 2023-09-10 DIAGNOSIS — R11.2 NAUSEA AND VOMITING, UNSPECIFIED VOMITING TYPE: Primary | ICD-10-CM

## 2023-09-10 RX ORDER — PROCHLORPERAZINE EDISYLATE 5 MG/ML
10 INJECTION INTRAMUSCULAR; INTRAVENOUS
Status: COMPLETED | OUTPATIENT
Start: 2023-09-11 | End: 2023-09-11

## 2023-09-10 RX ORDER — KETOROLAC TROMETHAMINE 30 MG/ML
10 INJECTION, SOLUTION INTRAMUSCULAR; INTRAVENOUS
Status: COMPLETED | OUTPATIENT
Start: 2023-09-11 | End: 2023-09-11

## 2023-09-10 NOTE — Clinical Note
FAUZIA NETTLES accompanied their child to the emergency department on 9/10/2023. They may return to work on 09/12/2023.      If you have any questions or concerns, please don't hesitate to call.      DANIAL PEARSON RN

## 2023-09-11 VITALS
OXYGEN SATURATION: 100 % | RESPIRATION RATE: 16 BRPM | SYSTOLIC BLOOD PRESSURE: 109 MMHG | TEMPERATURE: 98 F | BODY MASS INDEX: 35.67 KG/M2 | HEART RATE: 78 BPM | DIASTOLIC BLOOD PRESSURE: 69 MMHG | WEIGHT: 195 LBS

## 2023-09-11 LAB
ALBUMIN SERPL BCP-MCNC: 4.1 G/DL (ref 3.5–5.2)
ALP SERPL-CCNC: 68 U/L (ref 55–135)
ALT SERPL W/O P-5'-P-CCNC: 12 U/L (ref 10–44)
ANION GAP SERPL CALC-SCNC: 10 MMOL/L (ref 8–16)
ANION GAP SERPL CALC-SCNC: 15 MMOL/L (ref 8–16)
AST SERPL-CCNC: 13 U/L (ref 10–40)
B-HCG UR QL: NEGATIVE
BACTERIA #/AREA URNS AUTO: ABNORMAL /HPF
BASOPHILS # BLD AUTO: 0.02 K/UL (ref 0–0.2)
BASOPHILS NFR BLD: 0.3 % (ref 0–1.9)
BILIRUB SERPL-MCNC: 0.5 MG/DL (ref 0.1–1)
BILIRUB UR QL STRIP: ABNORMAL
BUN SERPL-MCNC: 10 MG/DL (ref 6–20)
BUN SERPL-MCNC: 9 MG/DL (ref 6–20)
CALCIUM SERPL-MCNC: 7.9 MG/DL (ref 8.7–10.5)
CALCIUM SERPL-MCNC: 9.9 MG/DL (ref 8.7–10.5)
CHLORIDE SERPL-SCNC: 109 MMOL/L (ref 95–110)
CHLORIDE SERPL-SCNC: 113 MMOL/L (ref 95–110)
CLARITY UR REFRACT.AUTO: ABNORMAL
CO2 SERPL-SCNC: 16 MMOL/L (ref 23–29)
CO2 SERPL-SCNC: 16 MMOL/L (ref 23–29)
COLOR UR AUTO: YELLOW
CREAT SERPL-MCNC: 0.6 MG/DL (ref 0.5–1.4)
CREAT SERPL-MCNC: 0.7 MG/DL (ref 0.5–1.4)
DIFFERENTIAL METHOD: ABNORMAL
EOSINOPHIL # BLD AUTO: 0 K/UL (ref 0–0.5)
EOSINOPHIL NFR BLD: 0.1 % (ref 0–8)
ERYTHROCYTE [DISTWIDTH] IN BLOOD BY AUTOMATED COUNT: 17 % (ref 11.5–14.5)
EST. GFR  (NO RACE VARIABLE): >60 ML/MIN/1.73 M^2
EST. GFR  (NO RACE VARIABLE): >60 ML/MIN/1.73 M^2
GLUCOSE SERPL-MCNC: 101 MG/DL (ref 70–110)
GLUCOSE SERPL-MCNC: 128 MG/DL (ref 70–110)
GLUCOSE UR QL STRIP: NEGATIVE
HCG INTACT+B SERPL-ACNC: <1.2 MIU/ML
HCT VFR BLD AUTO: 33.5 % (ref 37–48.5)
HGB BLD-MCNC: 10.3 G/DL (ref 12–16)
HGB UR QL STRIP: ABNORMAL
HYALINE CASTS UR QL AUTO: 0 /LPF
IMM GRANULOCYTES # BLD AUTO: 0.12 K/UL (ref 0–0.04)
IMM GRANULOCYTES NFR BLD AUTO: 1.6 % (ref 0–0.5)
KETONES UR QL STRIP: ABNORMAL
LEUKOCYTE ESTERASE UR QL STRIP: NEGATIVE
LIPASE SERPL-CCNC: 3 U/L (ref 4–60)
LYMPHOCYTES # BLD AUTO: 1.1 K/UL (ref 1–4.8)
LYMPHOCYTES NFR BLD: 14.7 % (ref 18–48)
MCH RBC QN AUTO: 23.6 PG (ref 27–31)
MCHC RBC AUTO-ENTMCNC: 30.7 G/DL (ref 32–36)
MCV RBC AUTO: 77 FL (ref 82–98)
MICROSCOPIC COMMENT: ABNORMAL
MONOCYTES # BLD AUTO: 0.3 K/UL (ref 0.3–1)
MONOCYTES NFR BLD: 4.6 % (ref 4–15)
NEUTROPHILS # BLD AUTO: 5.9 K/UL (ref 1.8–7.7)
NEUTROPHILS NFR BLD: 78.7 % (ref 38–73)
NITRITE UR QL STRIP: NEGATIVE
NRBC BLD-RTO: 0 /100 WBC
PH UR STRIP: 7 [PH] (ref 5–8)
PLATELET # BLD AUTO: 688 K/UL (ref 150–450)
PMV BLD AUTO: 9.2 FL (ref 9.2–12.9)
POTASSIUM SERPL-SCNC: 3.4 MMOL/L (ref 3.5–5.1)
POTASSIUM SERPL-SCNC: 3.4 MMOL/L (ref 3.5–5.1)
PROT SERPL-MCNC: 8.3 G/DL (ref 6–8.4)
PROT UR QL STRIP: ABNORMAL
RBC # BLD AUTO: 4.37 M/UL (ref 4–5.4)
RBC #/AREA URNS AUTO: 60 /HPF (ref 0–4)
SODIUM SERPL-SCNC: 139 MMOL/L (ref 136–145)
SODIUM SERPL-SCNC: 140 MMOL/L (ref 136–145)
SP GR UR STRIP: >=1.03 (ref 1–1.03)
SQUAMOUS #/AREA URNS AUTO: 3 /HPF
URN SPEC COLLECT METH UR: ABNORMAL
UROBILINOGEN UR STRIP-ACNC: <2 EU/DL
WBC # BLD AUTO: 7.43 K/UL (ref 3.9–12.7)
WBC #/AREA URNS AUTO: 5 /HPF (ref 0–5)

## 2023-09-11 PROCEDURE — 99285 EMERGENCY DEPT VISIT HI MDM: CPT | Mod: 25,ER

## 2023-09-11 PROCEDURE — 25500020 PHARM REV CODE 255: Mod: ER | Performed by: EMERGENCY MEDICINE

## 2023-09-11 PROCEDURE — 25000003 PHARM REV CODE 250: Mod: ER | Performed by: EMERGENCY MEDICINE

## 2023-09-11 PROCEDURE — 84702 CHORIONIC GONADOTROPIN TEST: CPT | Mod: ER | Performed by: EMERGENCY MEDICINE

## 2023-09-11 PROCEDURE — 96361 HYDRATE IV INFUSION ADD-ON: CPT | Mod: ER

## 2023-09-11 PROCEDURE — 96365 THER/PROPH/DIAG IV INF INIT: CPT | Mod: ER

## 2023-09-11 PROCEDURE — 81025 URINE PREGNANCY TEST: CPT | Mod: ER | Performed by: EMERGENCY MEDICINE

## 2023-09-11 PROCEDURE — 96375 TX/PRO/DX INJ NEW DRUG ADDON: CPT | Mod: ER

## 2023-09-11 PROCEDURE — 83690 ASSAY OF LIPASE: CPT | Mod: ER | Performed by: EMERGENCY MEDICINE

## 2023-09-11 PROCEDURE — 63600175 PHARM REV CODE 636 W HCPCS: Mod: ER | Performed by: EMERGENCY MEDICINE

## 2023-09-11 PROCEDURE — 81000 URINALYSIS NONAUTO W/SCOPE: CPT | Mod: ER | Performed by: EMERGENCY MEDICINE

## 2023-09-11 PROCEDURE — 80048 BASIC METABOLIC PNL TOTAL CA: CPT | Mod: ER,XB | Performed by: EMERGENCY MEDICINE

## 2023-09-11 PROCEDURE — 80053 COMPREHEN METABOLIC PANEL: CPT | Mod: ER | Performed by: EMERGENCY MEDICINE

## 2023-09-11 PROCEDURE — 85025 COMPLETE CBC W/AUTO DIFF WBC: CPT | Mod: ER | Performed by: EMERGENCY MEDICINE

## 2023-09-11 RX ORDER — PROMETHAZINE HYDROCHLORIDE 25 MG/1
25 SUPPOSITORY RECTAL EVERY 6 HOURS PRN
Qty: 10 SUPPOSITORY | Refills: 0 | Status: SHIPPED | OUTPATIENT
Start: 2023-09-11 | End: 2024-01-18

## 2023-09-11 RX ORDER — ONDANSETRON 4 MG/1
4 TABLET, FILM COATED ORAL EVERY 8 HOURS PRN
Qty: 12 TABLET | Refills: 0 | Status: SHIPPED | OUTPATIENT
Start: 2023-09-11 | End: 2024-01-18

## 2023-09-11 RX ORDER — HYDROMORPHONE HYDROCHLORIDE 2 MG/ML
0.5 INJECTION, SOLUTION INTRAMUSCULAR; INTRAVENOUS; SUBCUTANEOUS
Status: COMPLETED | OUTPATIENT
Start: 2023-09-11 | End: 2023-09-11

## 2023-09-11 RX ADMIN — SODIUM CHLORIDE 1000 ML: 9 INJECTION, SOLUTION INTRAVENOUS at 12:09

## 2023-09-11 RX ADMIN — KETOROLAC TROMETHAMINE 10 MG: 30 INJECTION, SOLUTION INTRAMUSCULAR at 12:09

## 2023-09-11 RX ADMIN — PROCHLORPERAZINE EDISYLATE 10 MG: 5 INJECTION INTRAMUSCULAR; INTRAVENOUS at 12:09

## 2023-09-11 RX ADMIN — SODIUM CHLORIDE 1000 ML: 9 INJECTION, SOLUTION INTRAVENOUS at 04:09

## 2023-09-11 RX ADMIN — SODIUM CHLORIDE 1000 ML: 9 INJECTION, SOLUTION INTRAVENOUS at 01:09

## 2023-09-11 RX ADMIN — PROMETHAZINE HYDROCHLORIDE 12.5 MG: 25 INJECTION INTRAMUSCULAR; INTRAVENOUS at 03:09

## 2023-09-11 RX ADMIN — IOHEXOL 75 ML: 350 INJECTION, SOLUTION INTRAVENOUS at 03:09

## 2023-09-11 RX ADMIN — HYDROMORPHONE HYDROCHLORIDE 0.5 MG: 2 INJECTION INTRAMUSCULAR; INTRAVENOUS; SUBCUTANEOUS at 03:09

## 2023-09-11 NOTE — ED PROVIDER NOTES
Encounter Date: 9/10/2023       History     Chief Complaint   Patient presents with    GI Problem     Gastroparesis flare up.      The history is provided by the patient.   GI Problem  The other symptoms of the illness include nausea and vomiting. The other symptoms of the illness do not include fever, shortness of breath, diarrhea or dysuria.   Nausea began today. The nausea is associated with eating. The nausea is exacerbated by food.   The vomiting began today. Vomiting occurs 2 to 5 times per day. The emesis contains stomach contents.   The patient states that she believes she is currently not pregnant. The patient has not had a change in bowel habit. Symptoms associated with the illness do not include back pain.     Review of patient's allergies indicates:   Allergen Reactions    Shrimp      No past medical history on file.  No past surgical history on file.  No family history on file.  Social History     Tobacco Use    Smoking status: Never    Smokeless tobacco: Never   Substance Use Topics    Alcohol use: Yes     Comment: occassionally    Drug use: Never     Review of Systems   Constitutional:  Negative for fever.   HENT:  Negative for sore throat.    Respiratory:  Negative for shortness of breath.    Cardiovascular:  Negative for chest pain.   Gastrointestinal:  Positive for nausea and vomiting. Negative for diarrhea.   Genitourinary:  Negative for dysuria.   Musculoskeletal:  Negative for back pain.   Skin:  Negative for rash.   Neurological:  Negative for weakness.   Hematological:  Does not bruise/bleed easily.       Physical Exam     Initial Vitals [09/10/23 2345]   BP Pulse Resp Temp SpO2   (!) 143/79 73 16 97.9 °F (36.6 °C) 100 %      MAP       --         Physical Exam    Nursing note and vitals reviewed.  Constitutional: She appears well-developed and well-nourished. No distress.   HENT:   Head: Normocephalic and atraumatic.   Mouth/Throat: Oropharynx is clear and moist.   Eyes: Conjunctivae and EOM  are normal. Pupils are equal, round, and reactive to light.   Neck: Neck supple.   Normal range of motion.  Cardiovascular:  Normal rate, regular rhythm and normal heart sounds.           Pulmonary/Chest: Breath sounds normal. No respiratory distress.   Abdominal: Abdomen is soft. Bowel sounds are normal. She exhibits no distension. There is no abdominal tenderness.   Musculoskeletal:         General: Normal range of motion.      Cervical back: Normal range of motion and neck supple.     Neurological: She is alert and oriented to person, place, and time. She has normal strength.   Skin: Skin is warm and dry.   Psychiatric: She has a normal mood and affect. Thought content normal.         ED Course   Procedures  Labs Reviewed   CBC W/ AUTO DIFFERENTIAL - Abnormal; Notable for the following components:       Result Value    Hemoglobin 10.3 (*)     Hematocrit 33.5 (*)     MCV 77 (*)     MCH 23.6 (*)     MCHC 30.7 (*)     RDW 17.0 (*)     Platelets 688 (*)     Immature Granulocytes 1.6 (*)     Immature Grans (Abs) 0.12 (*)     Gran % 78.7 (*)     Lymph % 14.7 (*)     All other components within normal limits   COMPREHENSIVE METABOLIC PANEL - Abnormal; Notable for the following components:    Potassium 3.4 (*)     CO2 16 (*)     Glucose 128 (*)     All other components within normal limits   LIPASE - Abnormal; Notable for the following components:    Lipase 3 (*)     All other components within normal limits   URINALYSIS, REFLEX TO URINE CULTURE - Abnormal; Notable for the following components:    Appearance, UA Cloudy (*)     Specific Gravity, UA >=1.030 (*)     Protein, UA 1+ (*)     Ketones, UA 3+ (*)     Bilirubin (UA) 1+ (*)     Occult Blood UA 3+ (*)     All other components within normal limits    Narrative:     Specimen Source->Urine   URINALYSIS MICROSCOPIC - Abnormal; Notable for the following components:    RBC, UA 60 (*)     All other components within normal limits    Narrative:     Specimen Source->Urine    BASIC METABOLIC PANEL - Abnormal; Notable for the following components:    Potassium 3.4 (*)     Chloride 113 (*)     CO2 16 (*)     Calcium 7.9 (*)     All other components within normal limits   PREGNANCY TEST, URINE RAPID    Narrative:     Specimen Source->Urine   HCG, QUANTITATIVE   HCG, QUANTITATIVE   BASIC METABOLIC PANEL     Results for orders placed or performed during the hospital encounter of 09/10/23   CBC W/ AUTO DIFFERENTIAL   Result Value Ref Range    WBC 7.43 3.90 - 12.70 K/uL    RBC 4.37 4.00 - 5.40 M/uL    Hemoglobin 10.3 (L) 12.0 - 16.0 g/dL    Hematocrit 33.5 (L) 37.0 - 48.5 %    MCV 77 (L) 82 - 98 fL    MCH 23.6 (L) 27.0 - 31.0 pg    MCHC 30.7 (L) 32.0 - 36.0 g/dL    RDW 17.0 (H) 11.5 - 14.5 %    Platelets 688 (H) 150 - 450 K/uL    MPV 9.2 9.2 - 12.9 fL    Immature Granulocytes 1.6 (H) 0.0 - 0.5 %    Gran # (ANC) 5.9 1.8 - 7.7 K/uL    Immature Grans (Abs) 0.12 (H) 0.00 - 0.04 K/uL    Lymph # 1.1 1.0 - 4.8 K/uL    Mono # 0.3 0.3 - 1.0 K/uL    Eos # 0.0 0.0 - 0.5 K/uL    Baso # 0.02 0.00 - 0.20 K/uL    nRBC 0 0 /100 WBC    Gran % 78.7 (H) 38.0 - 73.0 %    Lymph % 14.7 (L) 18.0 - 48.0 %    Mono % 4.6 4.0 - 15.0 %    Eosinophil % 0.1 0.0 - 8.0 %    Basophil % 0.3 0.0 - 1.9 %    Differential Method Automated    Comp. Metabolic Panel   Result Value Ref Range    Sodium 140 136 - 145 mmol/L    Potassium 3.4 (L) 3.5 - 5.1 mmol/L    Chloride 109 95 - 110 mmol/L    CO2 16 (L) 23 - 29 mmol/L    Glucose 128 (H) 70 - 110 mg/dL    BUN 10 6 - 20 mg/dL    Creatinine 0.7 0.5 - 1.4 mg/dL    Calcium 9.9 8.7 - 10.5 mg/dL    Total Protein 8.3 6.0 - 8.4 g/dL    Albumin 4.1 3.5 - 5.2 g/dL    Total Bilirubin 0.5 0.1 - 1.0 mg/dL    Alkaline Phosphatase 68 55 - 135 U/L    AST 13 10 - 40 U/L    ALT 12 10 - 44 U/L    eGFR >60.0 >60 mL/min/1.73 m^2    Anion Gap 15 8 - 16 mmol/L   Lipase   Result Value Ref Range    Lipase 3 (L) 4 - 60 U/L   Urinalysis, Reflex to Urine Culture Urine, Clean Catch    Specimen: Urine   Result  Value Ref Range    Specimen UA Urine, Clean Catch     Color, UA Yellow Yellow, Straw, Elizabet    Appearance, UA Cloudy (A) Clear    pH, UA 7.0 5.0 - 8.0    Specific Gravity, UA >=1.030 (A) 1.005 - 1.030    Protein, UA 1+ (A) Negative    Glucose, UA Negative Negative    Ketones, UA 3+ (A) Negative    Bilirubin (UA) 1+ (A) Negative    Occult Blood UA 3+ (A) Negative    Nitrite, UA Negative Negative    Urobilinogen, UA <2.0 <2.0 EU/dL    Leukocytes, UA Negative Negative   Pregnancy, urine rapid   Result Value Ref Range    Preg Test, Ur Negative    HCG, Quantitative   Result Value Ref Range    HCG Quant <1.2 See Text mIU/mL   Urinalysis Microscopic   Result Value Ref Range    RBC, UA 60 (H) 0 - 4 /hpf    WBC, UA 5 0 - 5 /hpf    Bacteria Occasional None-Occ /hpf    Squam Epithel, UA 3 /hpf    Hyaline Casts, UA 0 0-1/lpf /lpf    Microscopic Comment SEE COMMENT    Basic metabolic panel   Result Value Ref Range    Sodium 139 136 - 145 mmol/L    Potassium 3.4 (L) 3.5 - 5.1 mmol/L    Chloride 113 (H) 95 - 110 mmol/L    CO2 16 (L) 23 - 29 mmol/L    Glucose 101 70 - 110 mg/dL    BUN 9 6 - 20 mg/dL    Creatinine 0.6 0.5 - 1.4 mg/dL    Calcium 7.9 (L) 8.7 - 10.5 mg/dL    Anion Gap 10 8 - 16 mmol/L    eGFR >60.0 >60 mL/min/1.73 m^2            Imaging Results              CT Abdomen Pelvis With Contrast (Final result)  Result time 09/11/23 07:41:35      Final result by Louie Martin MD (09/11/23 07:41:35)                   Impression:      Mild thickening of the distal esophagus and mild rugal fold thickening within the stomach could reflect gastritis. Focal thickening within the pyloric region could reflect peptic ulcer disease.  Recommend consideration for follow-up upper GI or endoscopy.    All CT scans at this facility use dose modulation, iterative reconstruction, and/or weight based dosing when appropriate to reduce radiation dose to as low as reasonable achievable.      Electronically signed by: Louie Martin  MD  Date:    09/11/2023  Time:    07:41               Narrative:    EXAMINATION:  CT ABDOMEN PELVIS WITH CONTRAST    CLINICAL HISTORY:  Nausea/vomiting;Epigastric pain;    TECHNIQUE:  Low dose axial images, sagittal and coronal reformations were obtained from the lung bases to the pubic symphysis following the IV administration of 75 mL of Omnipaque 350.  Oral contrast was not administered.    COMPARISON:  08/14/2022    FINDINGS:  Heart: Normal size. No effusion.    Lung Bases: Clear.    Liver: Normal size and attenuation. No focal lesions.    Gallbladder: No calcified gallstones.    Bile Ducts: No dilatation.    Pancreas: No mass. No peripancreatic fat stranding.    Spleen: Normal.    Adrenals: Normal.    Kidneys/Ureters: Normal enhancement.  No mass or  hydroureteronephrosis.    Bladder: Mild nonspecific wall thickening.    Reproductive organs: Normal.    GI Tract/Mesentery: Small hiatal hernia.  Mild thickening of the distal esophagus and mild rugal fold thickening within the stomach could reflect gastritis.  Focal thickening within the pyloric region could reflect peptic ulcer disease.  Recommend consideration for follow-up upper GI or endoscopy.  No evidence of bowel obstruction or inflammation.  No evidence of appendicitis.    Peritoneal Space: No ascites or free air.    Retroperitoneum: No significant adenopathy.    Abdominal wall: Shotty nodes are seen within the inguinal regions which could be reactive    Vasculature: No aneurysm.    Bones: No acute fracture. No suspicious lytic or sclerotic lesions.                                       Medications   sodium chloride 0.9% bolus 1,000 mL 1,000 mL (0 mLs Intravenous Stopped 9/11/23 0146)   prochlorperazine injection Soln 10 mg (10 mg Intravenous Given 9/11/23 0012)   ketorolac injection 9.999 mg (9.999 mg Intravenous Given 9/11/23 0013)   sodium chloride 0.9% bolus 1,000 mL 1,000 mL (0 mLs Intravenous Stopped 9/11/23 0247)   iohexoL (OMNIPAQUE 350) injection  75 mL (75 mLs Intravenous Given 9/11/23 0302)   HYDROmorphone (PF) injection 0.5 mg (0.5 mg Intravenous Given 9/11/23 0329)   promethazine (PHENERGAN) 12.5 mg in dextrose 5 % (D5W) 50 mL IVPB (0 mg Intravenous Stopped 9/11/23 0349)   sodium chloride 0.9% bolus 1,000 mL 1,000 mL (0 mLs Intravenous Stopped 9/11/23 0540)     Medical Decision Making  Problems Addressed:  Epigastric pain: acute illness or injury  Nausea and vomiting, unspecified vomiting type: acute illness or injury    Amount and/or Complexity of Data Reviewed  External Data Reviewed: radiology.     Details: 4/23 Solid phase gastric emptying study demonstrating normal gastric retention compatible with expected emptying.   Labs: ordered.  Radiology: ordered.    Risk  Prescription drug management.  Decision regarding hospitalization.    Considered hospitalization but pt reports feeling improved and family states she appears better and wish to be discharged home.  Spoke with the patient and discussed todays findings, in addition to providing specific details for the plan of care and counseling regarding the diagnosis and prognosis. Questions are answered at this time.                              Clinical Impression:   Final diagnoses:  [R11.2] Nausea and vomiting, unspecified vomiting type (Primary)  [R10.13] Epigastric pain        ED Disposition Condition    Discharge Stable          ED Prescriptions       Medication Sig Dispense Start Date End Date Auth. Provider    promethazine (PHENERGAN) 25 MG suppository Place 1 suppository (25 mg total) rectally every 6 (six) hours as needed for Nausea. 10 suppository 9/11/2023 -- Matthew Grande MD    ondansetron (ZOFRAN) 4 MG tablet Take 1 tablet (4 mg total) by mouth every 8 (eight) hours as needed. 12 tablet 9/11/2023 -- Matthew Grande MD          Follow-up Information       Follow up With Specialties Details Why Contact Info    Gastroenterology  Call in 2 days      PCP  Call in 2 days       [facility] - Emergency Dept Emergency Medicine  If symptoms worsen [address]  [facility] [id number] [phone]             Matthew Grande MD  09/11/23 2836

## 2024-01-18 ENCOUNTER — HOSPITAL ENCOUNTER (EMERGENCY)
Facility: HOSPITAL | Age: 24
Discharge: HOME | End: 2024-01-18
Attending: EMERGENCY MEDICINE
Payer: MEDICAID

## 2024-01-18 VITALS
DIASTOLIC BLOOD PRESSURE: 65 MMHG | HEART RATE: 82 BPM | BODY MASS INDEX: 32.07 KG/M2 | RESPIRATION RATE: 18 BRPM | HEIGHT: 63 IN | WEIGHT: 181 LBS | SYSTOLIC BLOOD PRESSURE: 122 MMHG | TEMPERATURE: 99 F | OXYGEN SATURATION: 100 %

## 2024-01-18 DIAGNOSIS — O21.9 NAUSEA AND VOMITING DURING PREGNANCY: Primary | ICD-10-CM

## 2024-01-18 LAB
ALBUMIN SERPL BCP-MCNC: 3.7 G/DL (ref 3.5–5.2)
ALP SERPL-CCNC: 47 U/L (ref 55–135)
ALT SERPL W/O P-5'-P-CCNC: 20 U/L (ref 10–44)
ANION GAP SERPL CALC-SCNC: 13 MMOL/L (ref 8–16)
AST SERPL-CCNC: 14 U/L (ref 10–40)
B-HCG UR QL: POSITIVE
BACTERIA #/AREA URNS AUTO: ABNORMAL /HPF
BASOPHILS # BLD AUTO: 0.01 K/UL (ref 0–0.2)
BASOPHILS NFR BLD: 0.1 % (ref 0–1.9)
BILIRUB SERPL-MCNC: 0.3 MG/DL (ref 0.1–1)
BILIRUB UR QL STRIP: NEGATIVE
BUN SERPL-MCNC: 10 MG/DL (ref 6–20)
CALCIUM SERPL-MCNC: 10.1 MG/DL (ref 8.7–10.5)
CHLORIDE SERPL-SCNC: 106 MMOL/L (ref 95–110)
CLARITY UR REFRACT.AUTO: ABNORMAL
CO2 SERPL-SCNC: 17 MMOL/L (ref 23–29)
COLOR UR AUTO: YELLOW
CREAT SERPL-MCNC: 0.7 MG/DL (ref 0.5–1.4)
DIFFERENTIAL METHOD BLD: ABNORMAL
EOSINOPHIL # BLD AUTO: 0 K/UL (ref 0–0.5)
EOSINOPHIL NFR BLD: 0 % (ref 0–8)
ERYTHROCYTE [DISTWIDTH] IN BLOOD BY AUTOMATED COUNT: 16.4 % (ref 11.5–14.5)
EST. GFR  (NO RACE VARIABLE): >60 ML/MIN/1.73 M^2
GLUCOSE SERPL-MCNC: 126 MG/DL (ref 70–110)
GLUCOSE UR QL STRIP: NEGATIVE
HCT VFR BLD AUTO: 35.4 % (ref 37–48.5)
HGB BLD-MCNC: 11.6 G/DL (ref 12–16)
HGB UR QL STRIP: NEGATIVE
HYALINE CASTS UR QL AUTO: 0 /LPF
IMM GRANULOCYTES # BLD AUTO: 0.03 K/UL (ref 0–0.04)
IMM GRANULOCYTES NFR BLD AUTO: 0.3 % (ref 0–0.5)
KETONES UR QL STRIP: ABNORMAL
LEUKOCYTE ESTERASE UR QL STRIP: NEGATIVE
LIPASE SERPL-CCNC: 5 U/L (ref 4–60)
LYMPHOCYTES # BLD AUTO: 0.6 K/UL (ref 1–4.8)
LYMPHOCYTES NFR BLD: 6.6 % (ref 18–48)
MCH RBC QN AUTO: 25.8 PG (ref 27–31)
MCHC RBC AUTO-ENTMCNC: 32.8 G/DL (ref 32–36)
MCV RBC AUTO: 79 FL (ref 82–98)
MICROSCOPIC COMMENT: ABNORMAL
MONOCYTES # BLD AUTO: 0.3 K/UL (ref 0.3–1)
MONOCYTES NFR BLD: 2.6 % (ref 4–15)
NEUTROPHILS # BLD AUTO: 8.6 K/UL (ref 1.8–7.7)
NEUTROPHILS NFR BLD: 90.4 % (ref 38–73)
NITRITE UR QL STRIP: NEGATIVE
NRBC BLD-RTO: 0 /100 WBC
OTHER ELEMENTS URNS MICRO: ABNORMAL
PH UR STRIP: 6 [PH] (ref 5–8)
PLATELET # BLD AUTO: 504 K/UL (ref 150–450)
PMV BLD AUTO: 9.1 FL (ref 9.2–12.9)
POTASSIUM SERPL-SCNC: 3.4 MMOL/L (ref 3.5–5.1)
PROT SERPL-MCNC: 8.4 G/DL (ref 6–8.4)
PROT UR QL STRIP: ABNORMAL
RBC # BLD AUTO: 4.49 M/UL (ref 4–5.4)
RBC #/AREA URNS AUTO: 1 /HPF (ref 0–4)
SODIUM SERPL-SCNC: 136 MMOL/L (ref 136–145)
SP GR UR STRIP: >=1.03 (ref 1–1.03)
SQUAMOUS #/AREA URNS AUTO: 4 /HPF
URN SPEC COLLECT METH UR: ABNORMAL
UROBILINOGEN UR STRIP-ACNC: <2 EU/DL
WBC # BLD AUTO: 9.53 K/UL (ref 3.9–12.7)
WBC #/AREA URNS AUTO: 3 /HPF (ref 0–5)

## 2024-01-18 PROCEDURE — 25000003 PHARM REV CODE 250: Mod: ER | Performed by: EMERGENCY MEDICINE

## 2024-01-18 PROCEDURE — 80053 COMPREHEN METABOLIC PANEL: CPT | Mod: ER | Performed by: EMERGENCY MEDICINE

## 2024-01-18 PROCEDURE — 99284 EMERGENCY DEPT VISIT MOD MDM: CPT | Mod: 25,ER

## 2024-01-18 PROCEDURE — 96374 THER/PROPH/DIAG INJ IV PUSH: CPT | Mod: ER

## 2024-01-18 PROCEDURE — 63600175 PHARM REV CODE 636 W HCPCS: Mod: ER | Performed by: EMERGENCY MEDICINE

## 2024-01-18 PROCEDURE — 81025 URINE PREGNANCY TEST: CPT | Mod: ER | Performed by: EMERGENCY MEDICINE

## 2024-01-18 PROCEDURE — 85025 COMPLETE CBC W/AUTO DIFF WBC: CPT | Mod: ER | Performed by: EMERGENCY MEDICINE

## 2024-01-18 PROCEDURE — 83690 ASSAY OF LIPASE: CPT | Mod: ER | Performed by: EMERGENCY MEDICINE

## 2024-01-18 PROCEDURE — 81000 URINALYSIS NONAUTO W/SCOPE: CPT | Mod: ER | Performed by: EMERGENCY MEDICINE

## 2024-01-18 PROCEDURE — 96361 HYDRATE IV INFUSION ADD-ON: CPT | Mod: ER

## 2024-01-18 RX ORDER — ONDANSETRON HYDROCHLORIDE 2 MG/ML
4 INJECTION, SOLUTION INTRAVENOUS
Status: COMPLETED | OUTPATIENT
Start: 2024-01-18 | End: 2024-01-18

## 2024-01-18 RX ORDER — DEXTROSE MONOHYDRATE AND SODIUM CHLORIDE 5; .9 G/100ML; G/100ML
INJECTION, SOLUTION INTRAVENOUS
Status: COMPLETED | OUTPATIENT
Start: 2024-01-18 | End: 2024-01-18

## 2024-01-18 RX ORDER — ONDANSETRON 4 MG/1
4 TABLET, ORALLY DISINTEGRATING ORAL EVERY 6 HOURS PRN
Qty: 12 TABLET | Refills: 0 | OUTPATIENT
Start: 2024-01-18 | End: 2024-01-24

## 2024-01-18 RX ADMIN — SODIUM CHLORIDE 1000 ML: 9 INJECTION, SOLUTION INTRAVENOUS at 09:01

## 2024-01-18 RX ADMIN — ONDANSETRON 4 MG: 2 INJECTION INTRAMUSCULAR; INTRAVENOUS at 09:01

## 2024-01-18 RX ADMIN — DEXTROSE AND SODIUM CHLORIDE: 5; 900 INJECTION, SOLUTION INTRAVENOUS at 10:01

## 2024-01-18 NOTE — Clinical Note
Ambreen Bradford accompanied their child to the emergency department on 1/18/2024. They may return to work on 01/20/2024.      If you have any questions or concerns, please don't hesitate to call.       STEPH

## 2024-01-19 ENCOUNTER — TELEPHONE (OUTPATIENT)
Dept: OBSTETRICS AND GYNECOLOGY | Facility: CLINIC | Age: 24
End: 2024-01-19
Payer: MEDICAID

## 2024-01-19 NOTE — ED PROVIDER NOTES
ED Provider Note - 1/18/2024    History     Chief Complaint   Patient presents with    Emesis     Vomiting and abdominal pain began yesterday.      Patient currently presents with concern for nausea and vomiting.  Patient notes that this began earlier today.  Patient has a known history of recurring emesis previously attributed to gastroparesis though the patient's mother indicates that the diagnosis remains somewhat uncertain.  Patient has not had associated diarrhea.  There has been no fever or chills.  Denies urinary complaints.  Last menstrual cycle November 30th.  Denies abdominopelvic pain or vaginal bleeding.      Review of patient's allergies indicates:   Allergen Reactions    Shrimp      No past medical history on file.  No past surgical history on file.  No family history on file.  Social History     Tobacco Use    Smoking status: Never    Smokeless tobacco: Never   Substance Use Topics    Alcohol use: Yes     Comment: occassionally    Drug use: Never     Review of Systems   Constitutional:  Negative for chills and fever.   HENT:  Negative for congestion and rhinorrhea.    Respiratory:  Negative for cough and shortness of breath.    Cardiovascular:  Negative for chest pain and palpitations.   Gastrointestinal:  Positive for nausea and vomiting. Negative for abdominal pain and diarrhea.   Genitourinary:  Negative for difficulty urinating, dysuria, hematuria, pelvic pain, vaginal bleeding and vaginal discharge.   Skin:  Negative for color change and rash.   Neurological:  Negative for dizziness and light-headedness.   Hematological:  Negative for adenopathy. Does not bruise/bleed easily.     Physical Exam     Initial Vitals [01/18/24 2014]   BP Pulse Resp Temp SpO2   125/64 92 18 98.6 °F (37 °C) 100 %      MAP       --         Physical Exam    Nursing note and vitals reviewed.  Constitutional: She appears well-developed and well-nourished. She is not diaphoretic. No distress.   HENT:   Head: Normocephalic  and atraumatic.   Nose: Nose normal.   Mouth/Throat: Oropharynx is clear and moist.   Eyes: Conjunctivae are normal. No scleral icterus.   Cardiovascular:  Normal rate, regular rhythm, normal heart sounds and intact distal pulses.           Pulmonary/Chest: Breath sounds normal. No respiratory distress.   Abdominal: Abdomen is soft. She exhibits no distension. There is no abdominal tenderness.   Musculoskeletal:         General: No edema. Normal range of motion.     Neurological: She is alert and oriented to person, place, and time. She has normal strength.   Skin: Skin is warm and dry.         ED Course   Procedures                   MDM  Differential Diagnoses   Based on available history, the working differential diagnoses considered during this evaluation include but are not limited to cyclical vomiting/gastroparesis, gastroenteritis, pregnancy, urinary infection, intra-abdominal infection      LABS     Labs Reviewed   PREGNANCY TEST, URINE RAPID - Abnormal; Notable for the following components:       Result Value    Preg Test, Ur Positive (*)     All other components within normal limits    Narrative:     Specimen Source->Urine   URINALYSIS, REFLEX TO URINE CULTURE - Abnormal; Notable for the following components:    Appearance, UA Cloudy (*)     Specific Gravity, UA >=1.030 (*)     Protein, UA 2+ (*)     Ketones, UA 3+ (*)     All other components within normal limits    Narrative:     Specimen Source->Urine   COMPREHENSIVE METABOLIC PANEL - Abnormal; Notable for the following components:    Potassium 3.4 (*)     CO2 17 (*)     Glucose 126 (*)     Alkaline Phosphatase 47 (*)     All other components within normal limits   CBC W/ AUTO DIFFERENTIAL - Abnormal; Notable for the following components:    Hemoglobin 11.6 (*)     Hematocrit 35.4 (*)     MCV 79 (*)     MCH 25.8 (*)     RDW 16.4 (*)     Platelets 504 (*)     MPV 9.1 (*)     Gran # (ANC) 8.6 (*)     Lymph # 0.6 (*)     Gran % 90.4 (*)     Lymph % 6.6 (*)      Mono % 2.6 (*)     All other components within normal limits   URINALYSIS MICROSCOPIC - Abnormal; Notable for the following components:    Bacteria Moderate (*)     All other components within normal limits    Narrative:     Specimen Source->Urine   LIPASE           All available results from the labs ordered were independently reviewed. with findings as follows:  Pregnancy test positive.  Urinalysis notable for ketosis and 2+ protein.  Chemistry unremarkable.  CBC unremarkable.  Lipase negative.     Imaging     Imaging Results    None                EKG        ED Management/Discussion     Medications   sodium chloride 0.9% bolus 1,000 mL 1,000 mL (1,000 mLs Intravenous New Bag 1/18/24 2123)   ondansetron injection 4 mg (4 mg Intravenous Given 1/18/24 2123)   dextrose 5 % and 0.9 % NaCl infusion ( Intravenous New Bag 1/18/24 2203)                   The patient's list of active medical problems, social history, medications, and allergies as documented per RN staff has been reviewed.                 On final assessment, the patient appears suitable for discharge.  Patient has been advised to establish prenatal care.  We have encouraged use of doxylamine and pyridoxine pending follow-up.  We have also provided her with a prescription for Zofran to use for breakthrough vomiting.    I see no indication of abdominopelvic pain or vaginal bleeding/discharge.  I see no indication of an emergent process beyond that addressed during our encounter but have duly counseled the patient/family regarding the need for prompt follow-up as well as the indications that should prompt immediate return to the emergency room.  The patient/family has been provided with language -specific verbal and printed direction regarding our final diagnosis(es) as well as instructions regarding use of OTC and/or Rx medications intended to manage the patient's aforementioned conditions including:  ED Prescriptions       Medication Sig Dispense Start  Date End Date Auth. Provider    ondansetron (ZOFRAN-ODT) 4 MG TbDL Take 1 tablet (4 mg total) by mouth every 6 (six) hours as needed (nausea). 12 tablet 1/18/2024 -- Jag Larios MD              Patient has been advised of the following recommended follow-up instructions:  Follow-up Information       Follow up With Specialties Details Why Contact Info Additional Information    [facility] Ob[facility] Gynecology Schedule an appointment as soon as possible for a visit  for reassessment [address]  [facility] [id number] [phone] Please park in surface lot and check in at main registration.    [facility] - Emergency Dept Emergency Medicine Go to  As needed, If symptoms worsen [address]  [facility] [id number] [phone]           The patient/family communicates understanding of all this information and all remaining questions and concerns were addressed at this time.      Referrals:  Orders Placed This Encounter   Procedures    Ambulatory referral/consult to Obstetrics / Gynecology     Standing Status:   Future     Standing Expiration Date:   2/18/2025     Referral Priority:   Routine     Referral Type:   Consultation     Referral Reason:   Specialty Services Required     Requested Specialty:   Obstetrics and Gynecology     Number of Visits Requested:   1       CLINICAL IMPRESSION    ICD-10-CM ICD-9-CM   1. Nausea and vomiting during pregnancy  O21.9 643.90          ED Disposition Condition    Discharge Stable                 Jag Larios MD  01/18/24 8027

## 2024-01-19 NOTE — TELEPHONE ENCOUNTER
Lvm for pt to return call to get appointment scheduled with our department for her pregnancy per a referral.  Mychart message sent.

## 2024-01-24 ENCOUNTER — HOSPITAL ENCOUNTER (EMERGENCY)
Facility: HOSPITAL | Age: 24
Discharge: HOME | End: 2024-01-24
Attending: EMERGENCY MEDICINE
Payer: MEDICAID

## 2024-01-24 VITALS
WEIGHT: 170.5 LBS | RESPIRATION RATE: 17 BRPM | HEIGHT: 63 IN | BODY MASS INDEX: 30.21 KG/M2 | DIASTOLIC BLOOD PRESSURE: 95 MMHG | OXYGEN SATURATION: 97 % | HEART RATE: 89 BPM | SYSTOLIC BLOOD PRESSURE: 141 MMHG | TEMPERATURE: 98 F

## 2024-01-24 DIAGNOSIS — R11.2 NAUSEA AND VOMITING, UNSPECIFIED VOMITING TYPE: Primary | ICD-10-CM

## 2024-01-24 LAB
ALBUMIN SERPL BCP-MCNC: 3.8 G/DL (ref 3.5–5.2)
ALP SERPL-CCNC: 51 U/L (ref 55–135)
ALT SERPL W/O P-5'-P-CCNC: 37 U/L (ref 10–44)
ANION GAP SERPL CALC-SCNC: 14 MMOL/L (ref 8–16)
AST SERPL-CCNC: 24 U/L (ref 10–40)
BACTERIA #/AREA URNS AUTO: ABNORMAL /HPF
BASOPHILS # BLD AUTO: 0.03 K/UL (ref 0–0.2)
BASOPHILS NFR BLD: 0.3 % (ref 0–1.9)
BILIRUB SERPL-MCNC: 0.4 MG/DL (ref 0.1–1)
BILIRUB UR QL STRIP: ABNORMAL
BUN SERPL-MCNC: 8 MG/DL (ref 6–20)
CALCIUM SERPL-MCNC: 9.7 MG/DL (ref 8.7–10.5)
CHLORIDE SERPL-SCNC: 103 MMOL/L (ref 95–110)
CLARITY UR REFRACT.AUTO: ABNORMAL
CO2 SERPL-SCNC: 14 MMOL/L (ref 23–29)
COLOR UR AUTO: YELLOW
CREAT SERPL-MCNC: 0.6 MG/DL (ref 0.5–1.4)
DIFFERENTIAL METHOD BLD: ABNORMAL
EOSINOPHIL # BLD AUTO: 0.1 K/UL (ref 0–0.5)
EOSINOPHIL NFR BLD: 0.7 % (ref 0–8)
ERYTHROCYTE [DISTWIDTH] IN BLOOD BY AUTOMATED COUNT: 15.9 % (ref 11.5–14.5)
EST. GFR  (NO RACE VARIABLE): >60 ML/MIN/1.73 M^2
GLUCOSE SERPL-MCNC: 89 MG/DL (ref 70–110)
GLUCOSE UR QL STRIP: NEGATIVE
HCT VFR BLD AUTO: 39.6 % (ref 37–48.5)
HGB BLD-MCNC: 13.2 G/DL (ref 12–16)
HGB UR QL STRIP: NEGATIVE
HYALINE CASTS UR QL AUTO: 0 /LPF
IMM GRANULOCYTES # BLD AUTO: 0.14 K/UL (ref 0–0.04)
IMM GRANULOCYTES NFR BLD AUTO: 1.3 % (ref 0–0.5)
KETONES UR QL STRIP: ABNORMAL
LEUKOCYTE ESTERASE UR QL STRIP: ABNORMAL
LYMPHOCYTES # BLD AUTO: 2.3 K/UL (ref 1–4.8)
LYMPHOCYTES NFR BLD: 20.5 % (ref 18–48)
MCH RBC QN AUTO: 26.1 PG (ref 27–31)
MCHC RBC AUTO-ENTMCNC: 33.3 G/DL (ref 32–36)
MCV RBC AUTO: 78 FL (ref 82–98)
MICROSCOPIC COMMENT: ABNORMAL
MONOCYTES # BLD AUTO: 0.8 K/UL (ref 0.3–1)
MONOCYTES NFR BLD: 7.1 % (ref 4–15)
NEUTROPHILS # BLD AUTO: 7.8 K/UL (ref 1.8–7.7)
NEUTROPHILS NFR BLD: 70.1 % (ref 38–73)
NITRITE UR QL STRIP: NEGATIVE
NRBC BLD-RTO: 0 /100 WBC
PH UR STRIP: 6 [PH] (ref 5–8)
PLATELET # BLD AUTO: 545 K/UL (ref 150–450)
PMV BLD AUTO: 9.3 FL (ref 9.2–12.9)
POTASSIUM SERPL-SCNC: 3.4 MMOL/L (ref 3.5–5.1)
PROT SERPL-MCNC: 9.1 G/DL (ref 6–8.4)
PROT UR QL STRIP: ABNORMAL
RBC # BLD AUTO: 5.05 M/UL (ref 4–5.4)
RBC #/AREA URNS AUTO: 0 /HPF (ref 0–4)
SODIUM SERPL-SCNC: 131 MMOL/L (ref 136–145)
SP GR UR STRIP: >=1.03 (ref 1–1.03)
URN SPEC COLLECT METH UR: ABNORMAL
UROBILINOGEN UR STRIP-ACNC: NEGATIVE EU/DL
WBC # BLD AUTO: 11.08 K/UL (ref 3.9–12.7)
WBC #/AREA URNS AUTO: 6 /HPF (ref 0–5)

## 2024-01-24 PROCEDURE — 96361 HYDRATE IV INFUSION ADD-ON: CPT | Mod: ER

## 2024-01-24 PROCEDURE — 25000003 PHARM REV CODE 250: Mod: ER | Performed by: EMERGENCY MEDICINE

## 2024-01-24 PROCEDURE — 81000 URINALYSIS NONAUTO W/SCOPE: CPT | Mod: ER | Performed by: EMERGENCY MEDICINE

## 2024-01-24 PROCEDURE — 80053 COMPREHEN METABOLIC PANEL: CPT | Mod: ER | Performed by: EMERGENCY MEDICINE

## 2024-01-24 PROCEDURE — 87389 HIV-1 AG W/HIV-1&-2 AB AG IA: CPT | Performed by: EMERGENCY MEDICINE

## 2024-01-24 PROCEDURE — 63600175 PHARM REV CODE 636 W HCPCS: Mod: ER | Performed by: EMERGENCY MEDICINE

## 2024-01-24 PROCEDURE — 99284 EMERGENCY DEPT VISIT MOD MDM: CPT | Mod: ER,25

## 2024-01-24 PROCEDURE — 96374 THER/PROPH/DIAG INJ IV PUSH: CPT | Mod: ER

## 2024-01-24 PROCEDURE — 85025 COMPLETE CBC W/AUTO DIFF WBC: CPT | Mod: ER | Performed by: EMERGENCY MEDICINE

## 2024-01-24 PROCEDURE — 86803 HEPATITIS C AB TEST: CPT | Performed by: EMERGENCY MEDICINE

## 2024-01-24 RX ORDER — ONDANSETRON 4 MG/1
4 TABLET, ORALLY DISINTEGRATING ORAL EVERY 6 HOURS PRN
Qty: 15 TABLET | Refills: 0 | Status: SHIPPED | OUTPATIENT
Start: 2024-01-24

## 2024-01-24 RX ORDER — ONDANSETRON HYDROCHLORIDE 2 MG/ML
4 INJECTION, SOLUTION INTRAVENOUS
Status: COMPLETED | OUTPATIENT
Start: 2024-01-24 | End: 2024-01-24

## 2024-01-24 RX ADMIN — ONDANSETRON 4 MG: 2 INJECTION INTRAMUSCULAR; INTRAVENOUS at 03:01

## 2024-01-24 RX ADMIN — SODIUM CHLORIDE 1000 ML: 0.9 INJECTION, SOLUTION INTRAVENOUS at 03:01

## 2024-01-24 NOTE — ED PROVIDER NOTES
Encounter Date: 1/24/2024       History     Chief Complaint   Patient presents with    Emesis     Pt complains of vomiting since last visit. States they found out she was pregnant last visit. Concerned for dehydration. Also complains of lower back pain. Denies abd pain or vaginal bleeding. 1st OB appt scheduled for Feb. 6th     The history is provided by the patient.   Emesis   This is a recurrent problem. The current episode started yesterday. The problem occurs 2 - 4 times per day. The problem has been unchanged. The emesis has an appearance of stomach contents. Pertinent negatives include no abdominal pain, no arthralgias, no cough, no fever, no myalgias and no URI. Risk factors: Pregnant.     Review of patient's allergies indicates:   Allergen Reactions    Shrimp      No past medical history on file.  No past surgical history on file.  No family history on file.  Social History     Tobacco Use    Smoking status: Never    Smokeless tobacco: Never   Substance Use Topics    Alcohol use: Yes     Comment: occassionally    Drug use: Never     Review of Systems   Constitutional:  Negative for fever.   HENT:  Negative for sore throat.    Respiratory:  Negative for cough and shortness of breath.    Cardiovascular:  Negative for chest pain.   Gastrointestinal:  Positive for vomiting. Negative for abdominal pain and nausea.   Genitourinary:  Negative for dysuria.   Musculoskeletal:  Negative for arthralgias, back pain and myalgias.   Skin:  Negative for rash.   Neurological:  Negative for weakness.   Hematological:  Does not bruise/bleed easily.       Physical Exam     Initial Vitals [01/24/24 1511]   BP Pulse Resp Temp SpO2   (!) 141/95 89 17 97.8 °F (36.6 °C) 97 %      MAP       --         Physical Exam    Nursing note and vitals reviewed.  Constitutional: She appears well-developed and well-nourished. No distress.   HENT:   Head: Normocephalic and atraumatic.   Mouth/Throat: Oropharynx is clear and moist.   Eyes:  Conjunctivae and EOM are normal. Pupils are equal, round, and reactive to light.   Neck: Neck supple.   Normal range of motion.  Cardiovascular:  Normal rate, regular rhythm and normal heart sounds.     Exam reveals no gallop and no friction rub.       No murmur heard.  Pulmonary/Chest: Breath sounds normal. No respiratory distress. She has no wheezes. She has no rhonchi. She has no rales.   Abdominal: Abdomen is soft. Bowel sounds are normal. She exhibits no distension and no mass. There is no abdominal tenderness. There is no rebound and no guarding.   Musculoskeletal:         General: No tenderness or edema. Normal range of motion.      Cervical back: Normal range of motion and neck supple.     Neurological: She is alert and oriented to person, place, and time. She has normal strength.   Skin: Skin is warm and dry. No rash noted.   Psychiatric: She has a normal mood and affect. Thought content normal.         ED Course   Procedures  Labs Reviewed   CBC W/ AUTO DIFFERENTIAL - Abnormal; Notable for the following components:       Result Value    MCV 78 (*)     MCH 26.1 (*)     RDW 15.9 (*)     Platelets 545 (*)     Immature Granulocytes 1.3 (*)     Gran # (ANC) 7.8 (*)     Immature Grans (Abs) 0.14 (*)     All other components within normal limits   COMPREHENSIVE METABOLIC PANEL - Abnormal; Notable for the following components:    Sodium 131 (*)     Potassium 3.4 (*)     CO2 14 (*)     Total Protein 9.1 (*)     Alkaline Phosphatase 51 (*)     All other components within normal limits   URINALYSIS, REFLEX TO URINE CULTURE - Abnormal; Notable for the following components:    Appearance, UA Hazy (*)     Specific Gravity, UA >=1.030 (*)     Protein, UA 2+ (*)     Ketones, UA 3+ (*)     Bilirubin (UA) 1+ (*)     Leukocytes, UA Trace (*)     All other components within normal limits    Narrative:     Specimen Source->Urine   URINALYSIS MICROSCOPIC - Abnormal; Notable for the following components:    WBC, UA 6 (*)      Bacteria Few (*)     All other components within normal limits    Narrative:     Specimen Source->Urine   HIV 1 / 2 ANTIBODY   HEPATITIS C ANTIBODY   HEP C VIRUS HOLD SPECIMEN          Imaging Results    None          Medications   sodium chloride 0.9% bolus 1,000 mL 1,000 mL (1,000 mLs Intravenous New Bag 1/24/24 1535)   ondansetron injection 4 mg (4 mg Intravenous Given 1/24/24 1538)     Medical Decision Making  Recently found out she is pregnant, having some nausea and vomiting.  Concerned about dehydration  Ddx: vomiting, hyperemesis gravidarum     Amount and/or Complexity of Data Reviewed  Labs: ordered.    Risk  Prescription drug management.                                      Clinical Impression:  Final diagnoses:  [R11.2] Nausea and vomiting, unspecified vomiting type (Primary)          ED Disposition Condition    Discharge Stable          ED Prescriptions       Medication Sig Dispense Start Date End Date Auth. Provider    ondansetron (ZOFRAN-ODT) 4 MG TbDL Take 1 tablet (4 mg total) by mouth every 6 (six) hours as needed. 15 tablet 1/24/2024 -- Alan Becerra MD          Follow-up Information       Follow up With Specialties Details Why Contact Info    [facility]  In 2 days  [address] [facility] [address] [phone]               Alan Becerra MD  01/24/24 8220

## 2024-01-25 LAB
HCV AB SERPL QL IA: NEGATIVE
HEP C VIRUS HOLD SPECIMEN: NORMAL
HIV 1+2 AB+HIV1 P24 AG SERPL QL IA: NEGATIVE

## 2024-02-06 ENCOUNTER — OFFICE VISIT (OUTPATIENT)
Dept: OBSTETRICS AND GYNECOLOGY | Facility: CLINIC | Age: 24
End: 2024-02-06
Payer: MEDICAID

## 2024-02-06 ENCOUNTER — LAB VISIT (OUTPATIENT)
Dept: LAB | Facility: HOSPITAL | Age: 24
End: 2024-02-06
Payer: MEDICAID

## 2024-02-06 VITALS
DIASTOLIC BLOOD PRESSURE: 71 MMHG | BODY MASS INDEX: 32.92 KG/M2 | SYSTOLIC BLOOD PRESSURE: 108 MMHG | WEIGHT: 185.88 LBS

## 2024-02-06 DIAGNOSIS — Z32.01 POSITIVE PREGNANCY TEST: ICD-10-CM

## 2024-02-06 DIAGNOSIS — F12.10 TETRAHYDROCANNABINOL (THC) USE DISORDER, MILD, ABUSE: ICD-10-CM

## 2024-02-06 DIAGNOSIS — Z32.01 POSITIVE PREGNANCY TEST: Primary | ICD-10-CM

## 2024-02-06 DIAGNOSIS — K31.84 GASTROPARESIS: ICD-10-CM

## 2024-02-06 LAB
ABO + RH BLD: NORMAL
BASOPHILS # BLD AUTO: 0.02 K/UL (ref 0–0.2)
BASOPHILS NFR BLD: 0.2 % (ref 0–1.9)
BLD GP AB SCN CELLS X3 SERPL QL: NORMAL
DIFFERENTIAL METHOD BLD: ABNORMAL
EOSINOPHIL # BLD AUTO: 0.1 K/UL (ref 0–0.5)
EOSINOPHIL NFR BLD: 1.5 % (ref 0–8)
ERYTHROCYTE [DISTWIDTH] IN BLOOD BY AUTOMATED COUNT: 17.6 % (ref 11.5–14.5)
HCT VFR BLD AUTO: 32.8 % (ref 37–48.5)
HGB BLD-MCNC: 9.9 G/DL (ref 12–16)
IMM GRANULOCYTES # BLD AUTO: 0.02 K/UL (ref 0–0.04)
IMM GRANULOCYTES NFR BLD AUTO: 0.2 % (ref 0–0.5)
LYMPHOCYTES # BLD AUTO: 1.9 K/UL (ref 1–4.8)
LYMPHOCYTES NFR BLD: 23.9 % (ref 18–48)
MCH RBC QN AUTO: 25.6 PG (ref 27–31)
MCHC RBC AUTO-ENTMCNC: 30.2 G/DL (ref 32–36)
MCV RBC AUTO: 85 FL (ref 82–98)
MONOCYTES # BLD AUTO: 0.6 K/UL (ref 0.3–1)
MONOCYTES NFR BLD: 7.6 % (ref 4–15)
NEUTROPHILS # BLD AUTO: 5.4 K/UL (ref 1.8–7.7)
NEUTROPHILS NFR BLD: 66.6 % (ref 38–73)
NRBC BLD-RTO: 0 /100 WBC
PLATELET # BLD AUTO: 461 K/UL (ref 150–450)
PMV BLD AUTO: 9.3 FL (ref 9.2–12.9)
RBC # BLD AUTO: 3.86 M/UL (ref 4–5.4)
SPECIMEN OUTDATE: NORMAL
WBC # BLD AUTO: 8.04 K/UL (ref 3.9–12.7)

## 2024-02-06 PROCEDURE — 86592 SYPHILIS TEST NON-TREP QUAL: CPT

## 2024-02-06 PROCEDURE — 3078F DIAST BP <80 MM HG: CPT | Mod: CPTII,,,

## 2024-02-06 PROCEDURE — 83020 HEMOGLOBIN ELECTROPHORESIS: CPT

## 2024-02-06 PROCEDURE — 99212 OFFICE O/P EST SF 10 MIN: CPT | Mod: PBBFAC,TH,25

## 2024-02-06 PROCEDURE — 99203 OFFICE O/P NEW LOW 30 MIN: CPT | Mod: S$PBB,TH,,

## 2024-02-06 PROCEDURE — 86762 RUBELLA ANTIBODY: CPT

## 2024-02-06 PROCEDURE — 86901 BLOOD TYPING SEROLOGIC RH(D): CPT

## 2024-02-06 PROCEDURE — 81514 NFCT DS BV&VAGINITIS DNA ALG: CPT

## 2024-02-06 PROCEDURE — 87086 URINE CULTURE/COLONY COUNT: CPT

## 2024-02-06 PROCEDURE — 85025 COMPLETE CBC W/AUTO DIFF WBC: CPT

## 2024-02-06 PROCEDURE — 3074F SYST BP LT 130 MM HG: CPT | Mod: CPTII,,,

## 2024-02-06 PROCEDURE — 87389 HIV-1 AG W/HIV-1&-2 AB AG IA: CPT

## 2024-02-06 PROCEDURE — 80074 ACUTE HEPATITIS PANEL: CPT

## 2024-02-06 PROCEDURE — 3008F BODY MASS INDEX DOCD: CPT | Mod: CPTII,,,

## 2024-02-06 PROCEDURE — 99999 PR PBB SHADOW E&M-EST. PATIENT-LVL II: CPT | Mod: PBBFAC,,,

## 2024-02-06 PROCEDURE — 87491 CHLMYD TRACH DNA AMP PROBE: CPT

## 2024-02-06 NOTE — PROGRESS NOTES
"CHIEF COMPLAINT:   Patient presents with      Possible Pregnancy        HISTORY OF PRESENT ILLNESS  Violeta Bradford 23 y.o.  presents for pregnancy risk assessment.   The patient has no complaints today.  No nausea or vomiting. No bleeding or pain.  Pregnancy was not planned, and is desired.  Partner "Shlomo" is involved with the pregnancy.  Here today with mom "Ambreen".  Lives at home with relative.  Pets are not in the home. Patient is not working.  Denies domestic abuse.  Denies chemical/pesticide/radiation exposure.  OB history:       LMP: Patient's last menstrual period was 2023 (approximate).  EDC: Estimated Date of Delivery: 24  EGA: 13w2d       Health Maintenance   Topic Date Due    Lipid Panel  Never done    Chlamydia Screening  Never done    TETANUS VACCINE  Never done    Pap Smear  Never done    Hepatitis C Screening  Completed       History reviewed. No pertinent past medical history.    History reviewed. No pertinent surgical history.    Family History   Problem Relation Age of Onset    Hypertension Mother        Social History     Socioeconomic History    Marital status: Single   Tobacco Use    Smoking status: Former     Types: Vaping with nicotine    Smokeless tobacco: Never   Substance and Sexual Activity    Alcohol use: Yes     Comment: occassionally    Drug use: Never       Current Outpatient Medications   Medication Sig Dispense Refill    ondansetron (ZOFRAN-ODT) 4 MG TbDL Take 1 tablet (4 mg total) by mouth every 6 (six) hours as needed. 15 tablet 0    pantoprazole (PROTONIX) 40 MG tablet Take 40 mg by mouth every morning.       No current facility-administered medications for this visit.       Review of patient's allergies indicates:   Allergen Reactions    Iodine Swelling    Shrimp Swelling         PHYSICAL EXAM   Vitals:    24 1028   BP: 108/71        PAIN SCALE: 0-1    PHYSICAL EXAM    ROS:  GENERAL: No fever, chills, fatigability or weight loss.  CV: Denies chest " pain  PULM: Denies shortness of breath or wheezing.  ABDOMEN: Appetite fine. No weight loss. Denies diarrhea, abdominal pain, hematemesis or blood in stool.  URINARY: No flank pain, dysuria or hematuria.  REPRODUCTIVE: No abnormal vaginal bleeding.       PE:   APPEARANCE: Well nourished, well developed, in no acute distress  PELVIC:   VULVA: No lesions. Normal female genitalia.  URETHRAL MEATUS: Normal size and location, no lesions, no prolapse.  URETHRA: No masses, tenderness, prolapse or scarring.  VAGINA: Moist and well rugated, no discharge, no significant cystocele or rectocele.  ANUS PERINEUM: no external hemorrhoids.     UPT +    A/P:     Positive pregnancy test  -     Hepatitis Panel, Acute; Future; Expected date: 02/06/2024  -     HIV 1/2 Ag/Ab (4th Gen); Future; Expected date: 02/06/2024  -     RPR; Future; Expected date: 02/06/2024  -     Type & Screen; Future; Expected date: 02/06/2024  -     Rubella antibody, IgG; Future; Expected date: 02/06/2024  -     Urine culture  -     CBC auto differential; Future; Expected date: 02/06/2024  -     US OB/GYN Procedure ([facility]); Future  -     C. trachomatis/N. gonorrhoeae by AMP DNA [facility]; Vagina  -     Vaginosis Screen by DNA Probe  -     Hemoglobin Electrophoresis,Hgb A2 [place].; Future; Expected date: 02/06/2024    Gastroparesis  - followed by endo   - takes protonix 40mg   Tetrahydrocannabinol (THC) use disorder, mild, abuse  -     Drug screen panel, in-house  -     quit once finding out she was pregnant  -     also quit vaping once finding out she was rpegnant           -      Patient was counseled today on A.C.S. Pap guidelines and recommendations for yearly pelvic exams, mammograms and monthly self breast exams; to see her PCP for other health maintenance and pregnancy.   - Pap is not up to date. Pap was not done. Patient refused   -      Patient's medications and medical history reviewed with patient and implications in pregnancy.   -     Follow-up  initial OB and u/s.   -     Initial labs today

## 2024-02-07 LAB
BACTERIAL VAGINOSIS DNA: NEGATIVE
CANDIDA GLABRATA DNA: NEGATIVE
CANDIDA KRUSEI DNA: NEGATIVE
CANDIDA RRNA VAG QL PROBE: POSITIVE
HAV IGM SERPL QL IA: NORMAL
HBV CORE IGM SERPL QL IA: NORMAL
HBV SURFACE AG SERPL QL IA: NORMAL
HCV AB SERPL QL IA: NORMAL
HGB A2 MFR BLD HPLC: 2.5 % (ref 2.2–3.2)
HGB FRACT BLD ELPH-IMP: NORMAL
HGB FRACT BLD ELPH-IMP: NORMAL
HIV 1+2 AB+HIV1 P24 AG SERPL QL IA: NORMAL
RPR SER QL: NORMAL
RUBV IGG SER-ACNC: 48.1 IU/ML
RUBV IGG SER-IMP: REACTIVE
T VAGINALIS RRNA GENITAL QL PROBE: NEGATIVE

## 2024-02-08 LAB
BACTERIA UR CULT: NORMAL
BACTERIA UR CULT: NORMAL

## 2024-02-13 ENCOUNTER — INITIAL PRENATAL (OUTPATIENT)
Dept: OBSTETRICS AND GYNECOLOGY | Facility: CLINIC | Age: 24
End: 2024-02-13
Payer: MEDICAID

## 2024-02-13 ENCOUNTER — LAB VISIT (OUTPATIENT)
Dept: LAB | Facility: HOSPITAL | Age: 24
End: 2024-02-13
Attending: MIDWIFE
Payer: MEDICAID

## 2024-02-13 ENCOUNTER — PROCEDURE VISIT (OUTPATIENT)
Dept: OBSTETRICS AND GYNECOLOGY | Facility: CLINIC | Age: 24
End: 2024-02-13
Payer: MEDICAID

## 2024-02-13 VITALS — SYSTOLIC BLOOD PRESSURE: 106 MMHG | BODY MASS INDEX: 33 KG/M2 | DIASTOLIC BLOOD PRESSURE: 70 MMHG | WEIGHT: 186.31 LBS

## 2024-02-13 DIAGNOSIS — O99.012 ANEMIA AFFECTING PREGNANCY IN SECOND TRIMESTER: ICD-10-CM

## 2024-02-13 DIAGNOSIS — Z13.79 GENETIC TESTING: ICD-10-CM

## 2024-02-13 DIAGNOSIS — Z36.89 ENCOUNTER FOR FETAL ANATOMIC SURVEY: ICD-10-CM

## 2024-02-13 DIAGNOSIS — Z34.02 SUPERVISION OF NORMAL FIRST PREGNANCY IN SECOND TRIMESTER: Primary | ICD-10-CM

## 2024-02-13 DIAGNOSIS — Z32.01 POSITIVE PREGNANCY TEST: ICD-10-CM

## 2024-02-13 PROCEDURE — 99213 OFFICE O/P EST LOW 20 MIN: CPT | Mod: TH,S$PBB,25, | Performed by: MIDWIFE

## 2024-02-13 PROCEDURE — 36415 COLL VENOUS BLD VENIPUNCTURE: CPT | Performed by: MIDWIFE

## 2024-02-13 PROCEDURE — 99999 PR PBB SHADOW E&M-EST. PATIENT-LVL II: CPT | Mod: PBBFAC,,, | Performed by: MIDWIFE

## 2024-02-13 PROCEDURE — 99212 OFFICE O/P EST SF 10 MIN: CPT | Mod: PBBFAC,TH,25 | Performed by: MIDWIFE

## 2024-02-13 PROCEDURE — 76801 OB US < 14 WKS SINGLE FETUS: CPT | Mod: PBBFAC | Performed by: OBSTETRICS & GYNECOLOGY

## 2024-02-13 RX ORDER — FERROUS SULFATE 325(65) MG
325 TABLET, DELAYED RELEASE (ENTERIC COATED) ORAL 2 TIMES DAILY
Qty: 60 TABLET | Refills: 3 | Status: SHIPPED | OUTPATIENT
Start: 2024-02-13 | End: 2024-06-06

## 2024-02-13 NOTE — LETTER
February 13, 2024      [facility] - [facility] 2nd Fl  [address] [facility]  [place] [address]  Phone: [phone]  Fax: [phone]       Patient: Violeta Bradford   YOB: 2000  Date of Visit: 02/13/2024    To Whom It May Concern:    Joseluis Bradford  was at [facility] on 02/13/2024 accompanied by Ambreen Bradford. She may return to work/school on 02/14/2023 with no restrictions. If you have any questions or concerns, or if I can be of further assistance, please do not hesitate to contact me.    Sincerely,    JERARDO Frankel MA

## 2024-02-14 ENCOUNTER — PATIENT MESSAGE (OUTPATIENT)
Dept: ADMINISTRATIVE | Facility: OTHER | Age: 24
End: 2024-02-14
Payer: MEDICAID

## 2024-02-14 PROBLEM — O99.012 ANEMIA AFFECTING PREGNANCY IN SECOND TRIMESTER: Status: ACTIVE | Noted: 2024-02-14

## 2024-02-14 PROBLEM — Z34.02 SUPERVISION OF NORMAL FIRST PREGNANCY IN SECOND TRIMESTER: Status: ACTIVE | Noted: 2024-02-14

## 2024-02-14 PROBLEM — Z34.01 SUPERVISION OF NORMAL FIRST PREGNANCY IN FIRST TRIMESTER: Status: ACTIVE | Noted: 2024-02-14

## 2024-02-14 LAB
C TRACH DNA SPEC QL NAA+PROBE: NOT DETECTED
N GONORRHOEA DNA SPEC QL NAA+PROBE: NOT DETECTED

## 2024-02-14 NOTE — PROGRESS NOTES
23 y.o. female  at 15w1d   Pt here for initial OB and dating US   Denies VB, LOF or cramping  Doing well without concerns, pregnancy symptoms improving, still with some fatigue.     Supervision of normal first pregnancy in first trimester  - A to Z book given and reviewed  - prenatal labs reviewed, refused pap smear-will encourage to collect at postpartum visit   -routine PNC  -genetic testing offered and accepted today-[id number] ordered  -anatomy US ordered and scheduled      Anemia affecting pregnancy in second trimester  -begin Iron BID-discussed SE of iron and reviewed importance of compliance       Reviewed warning signs, pregnancy precautions and how/when to call.  RTC x 4 wks, call or present sooner prn.

## 2024-02-19 ENCOUNTER — TELEPHONE (OUTPATIENT)
Dept: OBSTETRICS AND GYNECOLOGY | Facility: CLINIC | Age: 24
End: 2024-02-19
Payer: MEDICAID

## 2024-02-19 ENCOUNTER — PATIENT MESSAGE (OUTPATIENT)
Dept: OTHER | Facility: OTHER | Age: 24
End: 2024-02-19
Payer: MEDICAID

## 2024-02-26 ENCOUNTER — PATIENT MESSAGE (OUTPATIENT)
Dept: OTHER | Facility: OTHER | Age: 24
End: 2024-02-26
Payer: MEDICAID

## 2024-03-18 ENCOUNTER — PATIENT MESSAGE (OUTPATIENT)
Dept: OTHER | Facility: OTHER | Age: 24
End: 2024-03-18
Payer: COMMERCIAL

## 2024-03-19 ENCOUNTER — ROUTINE PRENATAL (OUTPATIENT)
Dept: OBSTETRICS AND GYNECOLOGY | Facility: CLINIC | Age: 24
End: 2024-03-19
Payer: COMMERCIAL

## 2024-03-19 ENCOUNTER — PROCEDURE VISIT (OUTPATIENT)
Dept: OBSTETRICS AND GYNECOLOGY | Facility: CLINIC | Age: 24
End: 2024-03-19
Payer: COMMERCIAL

## 2024-03-19 VITALS
DIASTOLIC BLOOD PRESSURE: 72 MMHG | SYSTOLIC BLOOD PRESSURE: 110 MMHG | WEIGHT: 202.38 LBS | BODY MASS INDEX: 35.85 KG/M2

## 2024-03-19 DIAGNOSIS — Z3A.20 20 WEEKS GESTATION OF PREGNANCY: Primary | ICD-10-CM

## 2024-03-19 DIAGNOSIS — Z36.89 ENCOUNTER FOR FETAL ANATOMIC SURVEY: ICD-10-CM

## 2024-03-19 DIAGNOSIS — Z36.2 ENCOUNTER FOR FOLLOW-UP ULTRASOUND OF FETAL ANATOMY: ICD-10-CM

## 2024-03-19 DIAGNOSIS — O99.012 ANEMIA AFFECTING PREGNANCY IN SECOND TRIMESTER: ICD-10-CM

## 2024-03-19 PROCEDURE — 99999 PR PBB SHADOW E&M-EST. PATIENT-LVL II: CPT | Mod: PBBFAC,,,

## 2024-03-19 PROCEDURE — 76805 OB US >/= 14 WKS SNGL FETUS: CPT | Mod: S$GLB,,, | Performed by: OBSTETRICS & GYNECOLOGY

## 2024-03-19 PROCEDURE — 0502F SUBSEQUENT PRENATAL CARE: CPT | Mod: CPTII,S$GLB,,

## 2024-03-22 NOTE — PROGRESS NOTES
23 y.o. female  at 20w1d   is feeling flutters /FM, denies VB, LOF or cramping  Doing well without concerns   TW lbs   Reviewed anatomy US: , vertex, posterior placenta, 3VC, MI WNL, EFW 364g (13oz), 31%ile, CL 36.2mm, suboptimal views, f/u US NV  Genetic testing results reviewed     20 weeks gestation of pregnancy  - routine PNC   Encounter for follow-up ultrasound of fetal anatomy  -     US OB/GYN Procedure ([facility])-Future; Future    Anemia affecting pregnancy in second trimester  - iron BID      Reviewed warning signs, normal FM,  labor precautions and how/when to call. Patient states understanding.  RTC x 4 wks, call or present sooner prn.

## 2024-04-06 ENCOUNTER — NURSE TRIAGE (OUTPATIENT)
Dept: ADMINISTRATIVE | Facility: CLINIC | Age: 24
End: 2024-04-06
Payer: MEDICAID

## 2024-04-06 NOTE — TELEPHONE ENCOUNTER
R eye redden upon waking up. Caller states that she felt like something is in her eye at this time. Caller states pt is pregnant.  Pt advised per protocol and verbalized understanding.   Reason for Disposition   Foreign body (FB) stuck on eyeball  (Exception: Contact lens.)    Protocols used: Eye - Foreign Body-A-AH

## 2024-04-15 ENCOUNTER — TELEPHONE (OUTPATIENT)
Dept: OBSTETRICS AND GYNECOLOGY | Facility: CLINIC | Age: 24
End: 2024-04-15
Payer: MEDICAID

## 2024-04-15 ENCOUNTER — PROCEDURE VISIT (OUTPATIENT)
Dept: OBSTETRICS AND GYNECOLOGY | Facility: CLINIC | Age: 24
End: 2024-04-15
Payer: COMMERCIAL

## 2024-04-15 ENCOUNTER — PATIENT MESSAGE (OUTPATIENT)
Dept: OTHER | Facility: OTHER | Age: 24
End: 2024-04-15
Payer: MEDICAID

## 2024-04-15 ENCOUNTER — ROUTINE PRENATAL (OUTPATIENT)
Dept: OBSTETRICS AND GYNECOLOGY | Facility: CLINIC | Age: 24
End: 2024-04-15
Payer: COMMERCIAL

## 2024-04-15 VITALS
BODY MASS INDEX: 39.72 KG/M2 | SYSTOLIC BLOOD PRESSURE: 124 MMHG | WEIGHT: 224.19 LBS | DIASTOLIC BLOOD PRESSURE: 76 MMHG

## 2024-04-15 DIAGNOSIS — O99.012 ANEMIA AFFECTING PREGNANCY IN SECOND TRIMESTER: ICD-10-CM

## 2024-04-15 DIAGNOSIS — Z34.02 SUPERVISION OF NORMAL FIRST PREGNANCY IN SECOND TRIMESTER: ICD-10-CM

## 2024-04-15 DIAGNOSIS — Z36.2 ENCOUNTER FOR FOLLOW-UP ULTRASOUND OF FETAL ANATOMY: ICD-10-CM

## 2024-04-15 DIAGNOSIS — Z3A.24 24 WEEKS GESTATION OF PREGNANCY: Primary | ICD-10-CM

## 2024-04-15 DIAGNOSIS — K31.84 GASTROPARESIS: ICD-10-CM

## 2024-04-15 PROCEDURE — 0502F SUBSEQUENT PRENATAL CARE: CPT | Mod: CPTII,S$GLB,,

## 2024-04-15 PROCEDURE — 76816 OB US FOLLOW-UP PER FETUS: CPT | Mod: S$GLB,,, | Performed by: OBSTETRICS & GYNECOLOGY

## 2024-04-15 PROCEDURE — 99999 PR PBB SHADOW E&M-EST. PATIENT-LVL II: CPT | Mod: PBBFAC,,,

## 2024-04-15 NOTE — LETTER
April 15, 2024      [facility] 2nd Fl  [address] [facility]  [place] [address]  Phone: [phone]  Fax: [phone]       Patient: Violeta Bradford   YOB: 2000  Date of Visit: 04/15/2024    To Whom It May Concern:    Joseluis Bradford  was at [facility] on 04/15/2024 accompanied by Arianna Bradford . They may return to work/school on 04/17/2024 with no restrictions. If you have any questions or concerns, or if I can be of further assistance, please do not hesitate to contact me.    Sincerely,    Adriana Judge CNM

## 2024-04-15 NOTE — PROGRESS NOTES
23 y.o. female  at 24w0d   Reports + FM, denies VB, LOF, or cramping  Doing well without concerns   US reviewed: , breech, posterior placenta, 3VC, MI WNL, MVP 6.3cm, EFW 716g (1lb 9oz), 57%ile, anatomy appears complete, will await MFM review  TW lbs   Breast pump Rx: bottle feeding    Reviewed upcoming 28wk labs, (O POS) and orders placed, tdap handout provided and explained    24 weeks gestation of pregnancy  - routine PNC   Supervision of normal first pregnancy in second trimester  -     OB Glucose Screen; Future; Expected date: 04/15/2024  -     CBC auto differential; Future; Expected date: 04/15/2024  -     HIV 1/2 Ag/Ab (4th Gen); Future; Expected date: 04/15/2024  -     RPR; Future; Expected date: 04/15/2024    Anemia affecting pregnancy in second trimester  - iron BID  Gastroparesis  - Protonix 40mg       Reviewed warning signs, normal FM,  labor precautions and how/when to call. Patient states understanding.  RTC x 4 wks, call or present sooner prn.

## 2024-04-15 NOTE — TELEPHONE ENCOUNTER
----- Message from Mayela Marte sent at 4/15/2024  6:32 AM CDT -----  Contact: [phone]  Type:  Appt    Who Called: Ambreen  Would the patient rather a call back or a response via [facility]? Call  Best Call Back Number:  [phone]  Additional Information: Would like to know if she can have u/s after 12 today. Please call

## 2024-04-29 ENCOUNTER — PATIENT MESSAGE (OUTPATIENT)
Dept: OTHER | Facility: OTHER | Age: 24
End: 2024-04-29
Payer: MEDICAID

## 2024-05-13 ENCOUNTER — PATIENT MESSAGE (OUTPATIENT)
Dept: OTHER | Facility: OTHER | Age: 24
End: 2024-05-13
Payer: MEDICAID

## 2024-05-13 ENCOUNTER — ROUTINE PRENATAL (OUTPATIENT)
Dept: OBSTETRICS AND GYNECOLOGY | Facility: CLINIC | Age: 24
End: 2024-05-13
Payer: MEDICAID

## 2024-05-13 ENCOUNTER — LAB VISIT (OUTPATIENT)
Dept: LAB | Facility: HOSPITAL | Age: 24
End: 2024-05-13
Payer: MEDICAID

## 2024-05-13 VITALS
SYSTOLIC BLOOD PRESSURE: 134 MMHG | BODY MASS INDEX: 41.55 KG/M2 | DIASTOLIC BLOOD PRESSURE: 82 MMHG | WEIGHT: 234.56 LBS

## 2024-05-13 DIAGNOSIS — R03.0 ELEVATED BLOOD PRESSURE READING: ICD-10-CM

## 2024-05-13 DIAGNOSIS — Z23 ENCOUNTER FOR VACCINATION: ICD-10-CM

## 2024-05-13 DIAGNOSIS — Z34.02 SUPERVISION OF NORMAL FIRST PREGNANCY IN SECOND TRIMESTER: ICD-10-CM

## 2024-05-13 DIAGNOSIS — O99.012 ANEMIA AFFECTING PREGNANCY IN SECOND TRIMESTER: ICD-10-CM

## 2024-05-13 DIAGNOSIS — Z3A.28 28 WEEKS GESTATION OF PREGNANCY: Primary | ICD-10-CM

## 2024-05-13 LAB
ALBUMIN SERPL BCP-MCNC: 2.7 G/DL (ref 3.5–5.2)
ALP SERPL-CCNC: 67 U/L (ref 55–135)
ALT SERPL W/O P-5'-P-CCNC: 15 U/L (ref 10–44)
ANION GAP SERPL CALC-SCNC: 9 MMOL/L (ref 8–16)
AST SERPL-CCNC: 13 U/L (ref 10–40)
BILIRUB SERPL-MCNC: 0.1 MG/DL (ref 0.1–1)
BUN SERPL-MCNC: 9 MG/DL (ref 6–20)
CALCIUM SERPL-MCNC: 9.6 MG/DL (ref 8.7–10.5)
CHLORIDE SERPL-SCNC: 107 MMOL/L (ref 95–110)
CO2 SERPL-SCNC: 20 MMOL/L (ref 23–29)
CREAT SERPL-MCNC: 0.6 MG/DL (ref 0.5–1.4)
EST. GFR  (NO RACE VARIABLE): >60 ML/MIN/1.73 M^2
GLUCOSE SERPL-MCNC: 92 MG/DL (ref 70–110)
GLUCOSE SERPL-MCNC: 92 MG/DL (ref 70–140)
HIV 1+2 AB+HIV1 P24 AG SERPL QL IA: NORMAL
POTASSIUM SERPL-SCNC: 3.8 MMOL/L (ref 3.5–5.1)
PROT SERPL-MCNC: 7.1 G/DL (ref 6–8.4)
SODIUM SERPL-SCNC: 136 MMOL/L (ref 136–145)

## 2024-05-13 PROCEDURE — 82950 GLUCOSE TEST: CPT

## 2024-05-13 PROCEDURE — 99212 OFFICE O/P EST SF 10 MIN: CPT | Mod: PBBFAC,TH

## 2024-05-13 PROCEDURE — 87389 HIV-1 AG W/HIV-1&-2 AB AG IA: CPT

## 2024-05-13 PROCEDURE — 90715 TDAP VACCINE 7 YRS/> IM: CPT | Mod: PBBFAC

## 2024-05-13 PROCEDURE — 86592 SYPHILIS TEST NON-TREP QUAL: CPT

## 2024-05-13 PROCEDURE — 85025 COMPLETE CBC W/AUTO DIFF WBC: CPT

## 2024-05-13 PROCEDURE — 90471 IMMUNIZATION ADMIN: CPT | Mod: PBBFAC

## 2024-05-13 PROCEDURE — 80053 COMPREHEN METABOLIC PANEL: CPT

## 2024-05-13 PROCEDURE — 99999PBSHW PR PBB SHADOW TECHNICAL ONLY FILED TO HB: Mod: PBBFAC,,,

## 2024-05-13 PROCEDURE — 99999 PR PBB SHADOW E&M-EST. PATIENT-LVL II: CPT | Mod: PBBFAC,,,

## 2024-05-13 PROCEDURE — 99213 OFFICE O/P EST LOW 20 MIN: CPT | Mod: TH,S$PBB,,

## 2024-05-13 RX ORDER — FERROUS SULFATE TAB 325 MG (65 MG ELEMENTAL FE) 325 (65 FE) MG
TAB ORAL
COMMUNITY
Start: 2024-05-08 | End: 2024-05-14

## 2024-05-13 RX ADMIN — TETANUS TOXOID, REDUCED DIPHTHERIA TOXOID AND ACELLULAR PERTUSSIS VACCINE, ADSORBED 0.5 ML: 5; 2.5; 8; 8; 2.5 SUSPENSION INTRAMUSCULAR at 03:05

## 2024-05-13 NOTE — LETTER
May 13, 2024      [facility] 2nd Fl  [address] [facility]  [place] [address]  Phone: [phone]  Fax: [phone]       Ambreen Bradford   Date of Visit: 05/13/2024    To Whom It May Concern:    Ambreen Bradford was at [facility] on 05/13/2024. The patient may return to work/school on 05/14/2024 with no restrictions. If you have any questions or concerns, or if I can be of further assistance, please do not hesitate to contact me.    Sincerely,    Iris Smith LPN

## 2024-05-14 LAB
BASOPHILS # BLD AUTO: 0.01 K/UL (ref 0–0.2)
BASOPHILS NFR BLD: 0.1 % (ref 0–1.9)
DIFFERENTIAL METHOD BLD: ABNORMAL
EOSINOPHIL # BLD AUTO: 0.1 K/UL (ref 0–0.5)
EOSINOPHIL NFR BLD: 1.7 % (ref 0–8)
ERYTHROCYTE [DISTWIDTH] IN BLOOD BY AUTOMATED COUNT: 13.2 % (ref 11.5–14.5)
HCT VFR BLD AUTO: 35.4 % (ref 37–48.5)
HGB BLD-MCNC: 10.9 G/DL (ref 12–16)
IMM GRANULOCYTES # BLD AUTO: 0.06 K/UL (ref 0–0.04)
IMM GRANULOCYTES NFR BLD AUTO: 0.7 % (ref 0–0.5)
LYMPHOCYTES # BLD AUTO: 1.7 K/UL (ref 1–4.8)
LYMPHOCYTES NFR BLD: 19.5 % (ref 18–48)
MCH RBC QN AUTO: 27.4 PG (ref 27–31)
MCHC RBC AUTO-ENTMCNC: 30.8 G/DL (ref 32–36)
MCV RBC AUTO: 89 FL (ref 82–98)
MONOCYTES # BLD AUTO: 0.6 K/UL (ref 0.3–1)
MONOCYTES NFR BLD: 6.7 % (ref 4–15)
NEUTROPHILS # BLD AUTO: 6 K/UL (ref 1.8–7.7)
NEUTROPHILS NFR BLD: 71.3 % (ref 38–73)
NRBC BLD-RTO: 0 /100 WBC
PLATELET # BLD AUTO: 394 K/UL (ref 150–450)
PMV BLD AUTO: 9.9 FL (ref 9.2–12.9)
RBC # BLD AUTO: 3.98 M/UL (ref 4–5.4)
RPR SER QL: NORMAL
WBC # BLD AUTO: 8.46 K/UL (ref 3.9–12.7)

## 2024-05-14 NOTE — PROGRESS NOTES
23 y.o. female  at 28w0d   Reports + FM, denies VB, LOF or CTX  Doing well without concerns. Discussed BP is higher than it has normally been. Denies pre-E s/s. Reviewed life style modifications for decreasing blood pressure. Reviewed dietary recommendations. Patient verbalized understanding   Reviewed pre-E s/s. Notify if experiencing dizziness, headache, blurred vision, swelling in legs, and/or epigastric pain. Please take blood pressure if able. If blood pressure is greater than 140/90 repeat blood pressure again in 15 min.  If still greater than 140/90 notify provider. If unable to take blood pressure please call the office or report to labor and delivery for evaluation. Advised patient to take BP daily with connected MoMs    Life style modifications that can reduce blood pressure during pregnancy: low sodium diet, 30 min exercise per day, adequate hydration    TW lbs   28wk labs today (O POS)  Tdap today  LD card given and reviewed    28 weeks gestation of pregnancy  - routine PNC   Encounter for vaccination  -     Tdap (BOOSTRIX) vaccine injection 0.5 mL    Anemia affecting pregnancy in second trimester  - iron BID  Elevated blood pressure reading  -     COMPREHENSIVE METABOLIC PANEL; Future; Expected date: 2024  -     Protein / creatinine ratio, urine  -     baseline work up today        Reviewed warning signs, normal FKCs,  labor precautions and how/when to call. Patient states understanding  RTC x 2 wks, call or present sooner prn.

## 2024-05-15 ENCOUNTER — HOSPITAL ENCOUNTER (EMERGENCY)
Facility: HOSPITAL | Age: 24
Discharge: HOME | End: 2024-05-15
Attending: EMERGENCY MEDICINE
Payer: MEDICAID

## 2024-05-15 VITALS
WEIGHT: 230.19 LBS | TEMPERATURE: 98 F | RESPIRATION RATE: 18 BRPM | HEART RATE: 80 BPM | SYSTOLIC BLOOD PRESSURE: 131 MMHG | BODY MASS INDEX: 40.77 KG/M2 | OXYGEN SATURATION: 100 % | DIASTOLIC BLOOD PRESSURE: 73 MMHG

## 2024-05-15 DIAGNOSIS — N39.0 URINARY TRACT INFECTION WITHOUT HEMATURIA, SITE UNSPECIFIED: Primary | ICD-10-CM

## 2024-05-15 LAB
BACTERIA #/AREA URNS AUTO: ABNORMAL /HPF
BILIRUB UR QL STRIP: NEGATIVE
CLARITY UR REFRACT.AUTO: ABNORMAL
COLOR UR AUTO: YELLOW
GLUCOSE UR QL STRIP: NEGATIVE
HGB UR QL STRIP: NEGATIVE
KETONES UR QL STRIP: ABNORMAL
LEUKOCYTE ESTERASE UR QL STRIP: ABNORMAL
MICROSCOPIC COMMENT: ABNORMAL
NITRITE UR QL STRIP: NEGATIVE
PH UR STRIP: 7 [PH] (ref 5–8)
PROT UR QL STRIP: NEGATIVE
SP GR UR STRIP: 1.02 (ref 1–1.03)
URN SPEC COLLECT METH UR: ABNORMAL
UROBILINOGEN UR STRIP-ACNC: NEGATIVE EU/DL
WBC #/AREA URNS AUTO: 5 /HPF (ref 0–5)
YEAST UR QL AUTO: ABNORMAL

## 2024-05-15 PROCEDURE — 99284 EMERGENCY DEPT VISIT MOD MDM: CPT | Mod: ER

## 2024-05-15 PROCEDURE — 81000 URINALYSIS NONAUTO W/SCOPE: CPT | Mod: ER | Performed by: NURSE PRACTITIONER

## 2024-05-15 RX ORDER — ACETAMINOPHEN 325 MG/1
650 TABLET ORAL EVERY 6 HOURS PRN
Qty: 40 TABLET | Refills: 0 | Status: SHIPPED | OUTPATIENT
Start: 2024-05-15 | End: 2024-05-20

## 2024-05-15 RX ORDER — AMOXICILLIN AND CLAVULANATE POTASSIUM 875; 125 MG/1; MG/1
1 TABLET, FILM COATED ORAL 2 TIMES DAILY
Qty: 14 TABLET | Refills: 0 | Status: SHIPPED | OUTPATIENT
Start: 2024-05-15 | End: 2024-05-22

## 2024-05-15 NOTE — Clinical Note
Vianca accompanied their family member to the emergency department on 5/15/2024. They may return to work on 05/15/2024.      If you have any questions or concerns, please don't hesitate to call.       NP

## 2024-05-15 NOTE — Clinical Note
Vianca accompanied their family member to the emergency department on 5/15/2024. They may return to work on 05/16/2024.      If you have any questions or concerns, please don't hesitate to call.       STEPH

## 2024-05-15 NOTE — ED PROVIDER NOTES
Encounter Date: 5/15/2024       History     Chief Complaint   Patient presents with    Back Pain     Lower back pain began yesterday.        Patient presents to ER for low back pain, onset yesterday.  She denies any associated symptoms.  She has taken over-the-counter ibuprofen with mild relief.  She states she is currently 28 weeks pregnant,  1st pregnancy.  States she has had  prenatal care throughout this pregnancy.  She denies any known injury, denies any falls.  She denies fever, chills, generalized body aches, flank pain, hematuria, abdominal pain, nausea, vomiting, diarrhea, constipation, bladder/bowel dysfunction, saddle anesthesia, dysuria, vaginal bleeding, vaginal discharge, pelvic pain.    The history is provided by the patient.     Review of patient's allergies indicates:   Allergen Reactions    Iodine Swelling    Shrimp Swelling     Past Medical History:   Diagnosis Date    Anemia affecting pregnancy in second trimester 2/14/2024     No past surgical history on file.  Family History   Problem Relation Name Age of Onset    Hypertension Mother       Social History     Tobacco Use    Smoking status: Former     Types: Vaping with nicotine    Smokeless tobacco: Never   Substance Use Topics    Alcohol use: Yes     Comment: occassionally    Drug use: Never     Review of Systems   Constitutional:  Negative for chills, fatigue and fever.   HENT:  Negative for ear pain, rhinorrhea, sinus pain and sore throat.    Eyes:  Negative for pain.   Respiratory:  Negative for cough and shortness of breath.    Cardiovascular:  Negative for chest pain and palpitations.   Gastrointestinal:  Negative for abdominal pain, constipation, diarrhea, nausea and vomiting.   Genitourinary:  Negative for difficulty urinating, dysuria, flank pain, frequency, hematuria, pelvic pain, vaginal bleeding, vaginal discharge and vaginal pain.   Musculoskeletal:  Positive for back pain. Negative for myalgias and neck pain.   Skin:  Negative for  rash and wound.   Neurological:  Negative for weakness, numbness and headaches.   All other systems reviewed and are negative.      Physical Exam     Initial Vitals [05/15/24 1312]   BP Pulse Resp Temp SpO2   (!) 143/88 92 18 98.4 °F (36.9 °C) 100 %      MAP       --         Physical Exam    Nursing note and vitals reviewed.  Constitutional: She is not diaphoretic. She is cooperative.  Non-toxic appearance. She does not have a sickly appearance. She does not appear ill. No distress.   HENT:   Head: Normocephalic and atraumatic.   Mouth/Throat: Oropharynx is clear and moist.   Eyes: Conjunctivae are normal.   Neck: Neck supple.   Normal range of motion.  Cardiovascular:  Normal rate, regular rhythm and intact distal pulses.           Pulmonary/Chest: Breath sounds normal. No respiratory distress.   Abdominal: Abdomen is soft. Bowel sounds are normal. There is no abdominal tenderness.     No CVA tenderness.  No abdominal tenderness upon light or deep palpation.   Musculoskeletal:         General: Normal range of motion.      Cervical back: Normal range of motion and neck supple.     Neurological: She is alert and oriented to person, place, and time. She has normal strength. GCS score is 15. GCS eye subscore is 4. GCS verbal subscore is 5. GCS motor subscore is 6.   Skin: Skin is warm and dry. Capillary refill takes less than 2 seconds.         ED Course   Procedures  Labs Reviewed   URINALYSIS, REFLEX TO URINE CULTURE - Abnormal; Notable for the following components:       Result Value    Appearance, UA Hazy (*)     Ketones, UA 1+ (*)     Leukocytes, UA 2+ (*)     All other components within normal limits    Narrative:     Specimen Source->Urine   URINALYSIS MICROSCOPIC - Abnormal; Notable for the following components:    Bacteria Few (*)     Yeast, UA Rare (*)     All other components within normal limits    Narrative:     Specimen Source->Urine        Results for orders placed or performed during the hospital  encounter of 05/15/24   Urinalysis, Reflex to Urine Culture Urine, Clean Catch    Specimen: Urine   Result Value Ref Range    Specimen UA Urine, Clean Catch     Color, UA Yellow Yellow, Straw, Elizabet    Appearance, UA Hazy (A) Clear    pH, UA 7.0 5.0 - 8.0    Specific Gravity, UA 1.020 1.005 - 1.030    Protein, UA Negative Negative    Glucose, UA Negative Negative    Ketones, UA 1+ (A) Negative    Bilirubin (UA) Negative Negative    Occult Blood UA Negative Negative    Nitrite, UA Negative Negative    Urobilinogen, UA Negative <2.0 EU/dL    Leukocytes, UA 2+ (A) Negative   Urinalysis Microscopic   Result Value Ref Range    WBC, UA 5 0 - 5 /hpf    Bacteria Few (A) None-Occ /hpf    Yeast, UA Rare (A) None    Microscopic Comment SEE COMMENT          Imaging Results    None          Medications - No data to display  Medical Decision Making  Risk  OTC drugs.  Prescription drug management.                        1:27 PM-   Fetal heart tones 148 bpm          Results reviewed and discussed with patient she verbalized understanding with no further concerns.  No abdominal tenderness, no CVA tenderness.  Patient denies vaginal bleeding or vaginal discharge.  Vital signs stable, afebrile.  Will treat patient for urinary tract infection with Augmentin. Patient with nontraumatic low back pain,Tylenol Rx provided.   She is nontoxic/non ill-appearing.  Discussed outpatient follow-up with PCP/OBGYN.  Discussed signs and symptoms to return to ER.  Patient agrees with plan and voiced no further concerns.    I discussed with patient  that evaluation in the ED does not suggest any emergent or life threatening medical conditions requiring immediate intervention beyond what was provided in the ED, and I believe patient is safe for discharge. Regardless, an unremarkable evaluation in the ED does not preclude the development or presence of a serious of life threatening condition. As such, patient was instructed to return immediately for any  worsening or change in current symptoms.          Clinical Impression:  Final diagnoses:  [N39.0] Urinary tract infection without hematuria, site unspecified (Primary)          ED Disposition Condition    Discharge Stable          ED Prescriptions       Medication Sig Dispense Start Date End Date Auth. Provider    amoxicillin-clavulanate 875-125mg (AUGMENTIN) 875-125 mg per tablet Take 1 tablet by mouth 2 (two) times daily. for 7 days 14 tablet 5/15/2024 5/22/2024 Micha Reyes, [facility]    acetaminophen (TYLENOL) 325 MG tablet Take 2 tablets (650 mg total) by mouth every 6 (six) hours as needed for Pain. 40 tablet 5/15/2024 5/20/2024 Micha Reyes NP          Follow-up Information       Follow up With Specialties Details Why Contact Info    Follow-up with your OBGYN in 2 days.  In 2 days      [facility] - Emergency Dept Emergency Medicine  As needed, If symptoms worsen [address]  [facility] [id number] [phone]             Micha Reyes NP  05/15/24 2295

## 2024-05-15 NOTE — Clinical Note
"Violeta Logan" Suzette was seen and treated in our emergency department on 5/15/2024.  She may return to work on 05/16/2024.       If you have any questions or concerns, please don't hesitate to call.       NP    "

## 2024-05-15 NOTE — Clinical Note
Vianca accompanied their family member to the emergency department on 5/15/2024. They may return to work on 05/15/2024.      If you have any questions or concerns, please don't hesitate to call.       STEPH

## 2024-05-20 ENCOUNTER — TELEPHONE (OUTPATIENT)
Dept: OBSTETRICS AND GYNECOLOGY | Facility: CLINIC | Age: 24
End: 2024-05-20
Payer: MEDICAID

## 2024-05-20 ENCOUNTER — ROUTINE PRENATAL (OUTPATIENT)
Dept: OBSTETRICS AND GYNECOLOGY | Facility: CLINIC | Age: 24
End: 2024-05-20
Payer: MEDICAID

## 2024-05-20 VITALS
WEIGHT: 236.31 LBS | SYSTOLIC BLOOD PRESSURE: 126 MMHG | DIASTOLIC BLOOD PRESSURE: 61 MMHG | BODY MASS INDEX: 41.86 KG/M2

## 2024-05-20 DIAGNOSIS — O99.013 ANEMIA AFFECTING PREGNANCY IN THIRD TRIMESTER: ICD-10-CM

## 2024-05-20 DIAGNOSIS — O99.213 OTHER OBESITY AFFECTING PREGNANCY IN THIRD TRIMESTER: ICD-10-CM

## 2024-05-20 DIAGNOSIS — E66.8 OTHER OBESITY AFFECTING PREGNANCY IN THIRD TRIMESTER: ICD-10-CM

## 2024-05-20 DIAGNOSIS — K31.84 GASTROPARESIS: ICD-10-CM

## 2024-05-20 DIAGNOSIS — Z34.03 SUPERVISION OF NORMAL FIRST PREGNANCY IN THIRD TRIMESTER: Primary | ICD-10-CM

## 2024-05-20 PROCEDURE — 99213 OFFICE O/P EST LOW 20 MIN: CPT | Mod: TH,S$PBB,, | Performed by: ADVANCED PRACTICE MIDWIFE

## 2024-05-20 PROCEDURE — 99999 PR PBB SHADOW E&M-EST. PATIENT-LVL II: CPT | Mod: PBBFAC,,, | Performed by: ADVANCED PRACTICE MIDWIFE

## 2024-05-20 PROCEDURE — 99212 OFFICE O/P EST SF 10 MIN: CPT | Mod: PBBFAC,TH,PO | Performed by: ADVANCED PRACTICE MIDWIFE

## 2024-05-20 RX ORDER — NAPROXEN SODIUM 220 MG/1
81 TABLET, FILM COATED ORAL DAILY
Qty: 30 TABLET | Refills: 6 | Status: SHIPPED | OUTPATIENT
Start: 2024-05-20 | End: 2025-05-20

## 2024-05-20 NOTE — PATIENT INSTRUCTIONS
Patient Education       Pregnancy - The Seventh Month   About this topic   It is important for you to learn how to take care of yourself to help you have a healthy baby and safe delivery. It is good to have health care throughout your pregnancy.  The seventh month of your pregnancy starts around week 29 and lasts through week 32. By knowing how far along you are, you can learn what is normal for this stage of your pregnancy and plan for what is next.  General   Your body   During the seventh month of your pregnancy, here are some things you can expect.  You may:  Have weight gain of about 15 to 20 pounds (6.8 to 9 kg) total in your first 7 months.  Have more trouble moving about and sleeping as the baby gets bigger  Have very vivid dreams  Notice more vaginal discharge  Notice a creamy, watery substance leaking from your nipples  Need a shot called Rh immune globulin if your blood type may be different from your baby's  Your baby's growth and development:  Your baby is gaining weight and adding layers of fat.  Your baby turns to be head down, into the position for delivery.  They are able to respond to loud noises and to dream.  Your baby moves at least 10 times in 2 hours. If your baby isn't moving this much, talk to your doctor right away.  Your baby is about 16 inches (42 cm) long and weighs about 4 pounds (1,800 gm). Your baby is about the size of squash.  Things to Think About   Avoid alcohol, drugs, tobacco products, and second hand smoke  Think about what you want your baby's birth to be like. Who do you want with you?  Find out about what lactation services are covered by your insurance.  Look into lactation consultants and breastfeeding classes.  Where do you want to deliver? Its time to make a plan for labor and delivery.  Plan on getting a car seat and other things for your baby.  Talk to your doctor if you plan to travel or get on a plane.  When do I need to call the doctor?   Contractions every 10  minutes or more often that do not go away with drinking water or position changes.  Low, dull back pain that does not go away  Feeling unusually dizzy or tired  Pressure in your pelvis that feels like your baby is pushing down  A gush or constant trickle of watery or bloody fluid leaking from your vagina  Cramps in your lower belly that come and go or are constant  Little to no movement felt by baby in 2 hours. Your baby should be moving at least 10 times every 2 hours.  Headache that does not go away; blurry vision; seeing spots or halos; increase in swelling in your hands, feet, or face; and pain under your ribs on the right side  Vaginal bleeding with or without pain  Fever of 100.4°F (38°C) or higher  After a car accident, fall, or any trauma to your belly  Having thoughts of harming yourself or others, or do not feel safe at home  Where can I learn more?   American Academy of Family Physicians  https://familydoctor.org/changes-in-your-body-during-pregnancy-second-trimester/   American Academy of Family Physicians  https://familydoctor.org/changes-in-your-body-during-pregnancy-third-trimester/   [employer]  http://kidshealth.org/en/parents/pregnancy-calendar-intro.html?WT.ac=p-wm   Office on Womens Health  https://www.womenshealth.gov/pregnancy/youre-pregnant-now-what/stages-pregnancy   Last Reviewed Date   2020-05-06  Consumer Information Use and Disclaimer   This information is not specific medical advice and does not replace information you receive from your health care provider. This is only a brief summary of general information. It does NOT include all information about conditions, illnesses, injuries, tests, procedures, treatments, therapies, discharge instructions or life-style choices that may apply to you. You must talk with your health care provider for complete information about your health and treatment options. This information should not be used to decide whether or not to accept your health  care providers advice, instructions or recommendations. Only your health care provider has the knowledge and training to provide advice that is right for you.  Copyright   Copyright © 2021 UpToDate, Inc. and its affiliates and/or licensors. All rights reserved.  Patient Education       Fetal Movement   About this topic   Feeling your baby move for the first time is a good sign that your baby is doing well. You may begin to feel these movements between the 18th and 25th weeks of your pregnancy. If this is your first time being pregnant, it may be closer to 25 weeks. Your baby has been moving around before this, but the kicks have not been strong enough for you to feel. During the first weeks of feeling movement, you may start to see a pattern during the day when your baby is most active. You can track your baby's kicks each day at home. This is also known as kick counting. It is a good way to check on your baby's movements and well being.  Most often, fetal kick counting is used in high-risk pregnancies. It may be useful for all pregnancies. Counting and writing down your baby's kicks, jabs, twists, flutters, rolls, turns, flips, and swishes may help find a problem that needs more evaluation. The American College of Obstetricians and Gynecologists, or ACOG, suggests that you record how much time it takes you to feel 10 of these movements. Ideally, you should be able to feel 10 movements within 2 hours. Many people will track these movements in much less time.  General   How to Track Your Baby's Kick Counts   Most often your doctor will want you to wait until the 28th to 30th weeks of your pregnancy to start kick counting. Here are some tips to help you get started.  Find the time of day when your baby is most active. For some people, this is right after eating. Others find their baby moving a lot after they have been exercising or more active. Some babies are more active in the evenings when your blood sugar starts  "to lower.  Try to count kicks at about the same time each day.  Before you start counting, have something to eat or drink. Also take a short walk or do some light activity.  Choose a quiet place where you can focus on your baby's movements. Also get in a comfortable position. Try and lie on one side or the other. You may need to change positions until you find one that works best for you and your baby.  Keep a notebook to track your baby's kicks. Your doctor may give you a chart to use or you can make your own. Write down the date, time you started counting, and the time of each "kick" during a 2-hour period until you have felt 10 kicks.  Once you have recorded 10 kicks within 2 hours you can stop counting.  If you are not able to record 10 movements over 2 hours you should get up and move around or eat something and try again.  If you are not able to record 10 movements over 2 hours the second time, call your doctor. They may want you to go to the hospital to get your baby checked.  When do I need to call the doctor?   You have felt less than 10 movements over a period of 2 hours.  It takes longer each day to record 10 movements.  There is a big change in the pattern of movements you are writing down.  You feel no movement for 2 hours even after eating a snack, light activity, and position changes.  Teach Back: Helping You Understand   The Teach Back Method helps you understand the information we are giving you. After you talk with the staff, tell them in your own words what you learned. This helps to make sure the staff has described each thing clearly. It also helps to explain things that may have been confusing. Before going home, make sure you can do these:  I can tell you about feeling my baby move.  I can tell you how I will track my babys kicks.  I can tell you what I will do if I feel less than 10 movements in 2 hours, it takes longer to feel my baby move 10 times, or there is a big change in how my baby " is moving.  Last Reviewed Date   2021-10-08  Consumer Information Use and Disclaimer   This information is not specific medical advice and does not replace information you receive from your health care provider. This is only a brief summary of general information. It does NOT include all information about conditions, illnesses, injuries, tests, procedures, treatments, therapies, discharge instructions or life-style choices that may apply to you. You must talk with your health care provider for complete information about your health and treatment options. This information should not be used to decide whether or not to accept your health care providers advice, instructions or recommendations. Only your health care provider has the knowledge and training to provide advice that is right for you.  Copyright   Copyright © 2021 UpToDate, Inc. and its affiliates and/or licensors. All rights reserved.

## 2024-05-20 NOTE — PROGRESS NOTES
23 y.o. female  at 29w0d   Reports + FM, denies VB, LOF or CTX  Doing well without concerns   TW lbs   Reviewed 28wk lab results (O POS) H/H: 9.9, RPR negative, HIV negative   Glucose screen Normal 92  Tdap given 513 24  Anemia:  Mild-iron supper    Supervision of normal first pregnancy in third trimester  Routine prenatal care per protocol    Anemia affecting pregnancy in third trimester  Iron supplement    Gastroparesis  Protonix    BMI 41-obesity in pregnancy  Other orders  -     aspirin 81 MG Chew; Take 1 tablet (81 mg total) by mouth once daily.  Dispense: 30 tablet; Refill: 6    Reviewed warning signs, normal FKCs,  labor precautions and how/when to call. Patient states understanding  RTC x 2 wks, call or present sooner prn

## 2024-05-20 NOTE — TELEPHONE ENCOUNTER
----- Message from Yolis Calderon sent at 5/20/2024  8:48 AM CDT -----  Contact: Ambreen/ Mother  Ambreen is calling to speak to the nurse regarding the patient scheduled appointment, she would like to know if it can be moved to a closer location the one in [facility], please give her a call at  [phone]    Thanks  LJ

## 2024-05-20 NOTE — TELEPHONE ENCOUNTER
Spoke with patient's mom and per request appointment rescheduled to [facility] today at 2:20 pm as it is closer for them.

## 2024-05-27 ENCOUNTER — PATIENT MESSAGE (OUTPATIENT)
Dept: OTHER | Facility: OTHER | Age: 24
End: 2024-05-27
Payer: MEDICAID

## 2024-06-05 ENCOUNTER — ROUTINE PRENATAL (OUTPATIENT)
Dept: OBSTETRICS AND GYNECOLOGY | Facility: CLINIC | Age: 24
End: 2024-06-05
Payer: MEDICAID

## 2024-06-05 VITALS
WEIGHT: 240.94 LBS | DIASTOLIC BLOOD PRESSURE: 82 MMHG | SYSTOLIC BLOOD PRESSURE: 124 MMHG | BODY MASS INDEX: 42.68 KG/M2

## 2024-06-05 DIAGNOSIS — O26.03 EXCESSIVE WEIGHT GAIN DURING PREGNANCY IN THIRD TRIMESTER: ICD-10-CM

## 2024-06-05 DIAGNOSIS — O99.213 OBESITY AFFECTING PREGNANCY IN THIRD TRIMESTER, UNSPECIFIED OBESITY TYPE: ICD-10-CM

## 2024-06-05 DIAGNOSIS — Z3A.31 31 WEEKS GESTATION OF PREGNANCY: Primary | ICD-10-CM

## 2024-06-05 DIAGNOSIS — O99.013 ANEMIA AFFECTING PREGNANCY IN THIRD TRIMESTER: ICD-10-CM

## 2024-06-05 PROCEDURE — 99999 PR PBB SHADOW E&M-EST. PATIENT-LVL II: CPT | Mod: PBBFAC,,,

## 2024-06-05 PROCEDURE — 99214 OFFICE O/P EST MOD 30 MIN: CPT | Mod: TH,S$PBB,,

## 2024-06-05 PROCEDURE — 99212 OFFICE O/P EST SF 10 MIN: CPT | Mod: PBBFAC,TH

## 2024-06-05 NOTE — PROGRESS NOTES
23 y.o. female  at 31w2d   Reports + FM, denies VB, LOF or CTX  Doing well without concerns. Had 1 BH the other day.   Reviewed pre-E work up.  Lab did not run p/c ratio.  Patient states she did leave urine in the lab.  Will repeat if patient BP becomes elevated. Denies pre-E s/s. Reviewed pre-E s/s. Notify if experiencing dizziness, headache, blurred vision, swelling in legs, and/or epigastric pain. Please take blood pressure if able. If blood pressure is greater than 140/90 repeat blood pressure again in 15 min.  If still greater than 140/90 notify provider. If unable to take blood pressure please call the office or report to labor and delivery for evaluation.   Life style modifications that can reduce blood pressure during pregnancy: low sodium diet, 30 min exercise per day, adequate hydration  Make sure that you have been at rest for at least 20 min, your legs are not crossed, and your arm is at the level of your heart (like resting on a table) when you take your blood pressure.        Discussed weight gain and diet. Patient verbalized understanding. If desires would be appropriate to start ASA due to current BMI of 42.  Does not meet protocol of BMI  testing due to pregravid BMI of 33. Patient verbalized understanding     TW lbs   Tdap 24  Anemia iron BID     31 weeks gestation of pregnancy  - routine PNC   Anemia affecting pregnancy in third trimester  - iron BID  Obesity affecting pregnancy in third trimester, unspecified obesity type    Excessive weight gain in pregnancy, third trimester        Reviewed warning signs, normal FKCs,  labor precautions and how/when to call. Patient states understanding  RTC x 2 wks, call or present sooner prn

## 2024-06-06 RX ORDER — FERROUS SULFATE 325(65) MG
TABLET, DELAYED RELEASE (ENTERIC COATED) ORAL 2 TIMES DAILY
Qty: 60 TABLET | Refills: 0 | Status: SHIPPED | OUTPATIENT
Start: 2024-06-06

## 2024-06-10 ENCOUNTER — PATIENT MESSAGE (OUTPATIENT)
Dept: OTHER | Facility: OTHER | Age: 24
End: 2024-06-10
Payer: MEDICAID

## 2024-06-13 ENCOUNTER — ROUTINE PRENATAL (OUTPATIENT)
Dept: OBSTETRICS AND GYNECOLOGY | Facility: CLINIC | Age: 24
End: 2024-06-13
Payer: MEDICAID

## 2024-06-13 VITALS
SYSTOLIC BLOOD PRESSURE: 129 MMHG | WEIGHT: 245.81 LBS | BODY MASS INDEX: 43.54 KG/M2 | DIASTOLIC BLOOD PRESSURE: 82 MMHG

## 2024-06-13 DIAGNOSIS — Z3A.32 32 WEEKS GESTATION OF PREGNANCY: Primary | ICD-10-CM

## 2024-06-13 DIAGNOSIS — O99.013 ANEMIA AFFECTING PREGNANCY IN THIRD TRIMESTER: ICD-10-CM

## 2024-06-13 DIAGNOSIS — O99.213 OBESITY AFFECTING PREGNANCY IN THIRD TRIMESTER, UNSPECIFIED OBESITY TYPE: ICD-10-CM

## 2024-06-13 PROCEDURE — 99214 OFFICE O/P EST MOD 30 MIN: CPT | Mod: TH,S$PBB,,

## 2024-06-13 PROCEDURE — 99212 OFFICE O/P EST SF 10 MIN: CPT | Mod: PBBFAC,TH

## 2024-06-13 PROCEDURE — 99999 PR PBB SHADOW E&M-EST. PATIENT-LVL II: CPT | Mod: PBBFAC,,,

## 2024-06-13 NOTE — PROGRESS NOTES
23 y.o. female  at 32w3d   Reports + FM, denies VB, LOF or CTX  Doing well without concerns   TW lbs   Tdap 24  Anemia iron BID     32 weeks gestation of pregnancy  - routine PNC   Anemia affecting pregnancy in third trimester  - iron BID  Obesity affecting pregnancy in third trimester, unspecified obesity type        Reviewed warning signs, normal FKCs,  labor precautions and how/when to call. Patient states understanding  RTC x 2 wks, call or present sooner prn

## 2024-06-27 ENCOUNTER — PATIENT MESSAGE (OUTPATIENT)
Dept: OBSTETRICS AND GYNECOLOGY | Facility: CLINIC | Age: 24
End: 2024-06-27
Payer: MEDICAID

## 2024-06-27 ENCOUNTER — TELEPHONE (OUTPATIENT)
Dept: OBSTETRICS AND GYNECOLOGY | Facility: CLINIC | Age: 24
End: 2024-06-27
Payer: MEDICAID

## 2024-06-27 NOTE — TELEPHONE ENCOUNTER
Attempted to call patient, no answer, left message need to reschedule visit with Adriana today.  My chart message also sent.

## 2024-07-01 ENCOUNTER — PROCEDURE VISIT (OUTPATIENT)
Dept: OBSTETRICS AND GYNECOLOGY | Facility: CLINIC | Age: 24
End: 2024-07-01
Payer: MEDICAID

## 2024-07-01 ENCOUNTER — PATIENT MESSAGE (OUTPATIENT)
Dept: OTHER | Facility: OTHER | Age: 24
End: 2024-07-01
Payer: MEDICAID

## 2024-07-01 DIAGNOSIS — Z32.01 POSITIVE PREGNANCY TEST: ICD-10-CM

## 2024-07-01 PROCEDURE — 76816 OB US FOLLOW-UP PER FETUS: CPT | Mod: PBBFAC,PO | Performed by: OBSTETRICS & GYNECOLOGY

## 2024-07-02 ENCOUNTER — HOSPITAL ENCOUNTER (OUTPATIENT)
Facility: HOSPITAL | Age: 24
Discharge: HOME | End: 2024-07-03
Attending: EMERGENCY MEDICINE | Admitting: OBSTETRICS & GYNECOLOGY
Payer: MEDICAID

## 2024-07-02 DIAGNOSIS — R10.9 ABDOMINAL PAIN AFFECTING PREGNANCY: Primary | ICD-10-CM

## 2024-07-02 DIAGNOSIS — O26.899 ABDOMINAL PAIN AFFECTING PREGNANCY: Primary | ICD-10-CM

## 2024-07-02 PROBLEM — N76.0 BV (BACTERIAL VAGINOSIS): Status: ACTIVE | Noted: 2024-07-02

## 2024-07-02 PROBLEM — B96.89 BV (BACTERIAL VAGINOSIS): Status: ACTIVE | Noted: 2024-07-02

## 2024-07-02 PROBLEM — O16.3 ELEVATED BLOOD PRESSURE AFFECTING PREGNANCY IN THIRD TRIMESTER, ANTEPARTUM: Status: ACTIVE | Noted: 2024-07-02

## 2024-07-02 LAB
ALBUMIN SERPL BCP-MCNC: 2.5 G/DL (ref 3.5–5.2)
ALP SERPL-CCNC: 100 U/L (ref 55–135)
ALT SERPL W/O P-5'-P-CCNC: 18 U/L (ref 10–44)
ANION GAP SERPL CALC-SCNC: 10 MMOL/L (ref 8–16)
AST SERPL-CCNC: 15 U/L (ref 10–40)
BASOPHILS # BLD AUTO: 0.02 K/UL (ref 0–0.2)
BASOPHILS NFR BLD: 0.3 % (ref 0–1.9)
BILIRUB SERPL-MCNC: 0.3 MG/DL (ref 0.1–1)
BUN SERPL-MCNC: 6 MG/DL (ref 6–20)
CALCIUM SERPL-MCNC: 9.4 MG/DL (ref 8.7–10.5)
CHLORIDE SERPL-SCNC: 110 MMOL/L (ref 95–110)
CO2 SERPL-SCNC: 16 MMOL/L (ref 23–29)
CREAT SERPL-MCNC: 0.5 MG/DL (ref 0.5–1.4)
CREAT UR-MCNC: 49.8 MG/DL (ref 15–325)
CREAT UR-MCNC: 49.8 MG/DL (ref 15–325)
CREAT UR-MCNC: 54.9 MG/DL (ref 15–325)
DIFFERENTIAL METHOD BLD: ABNORMAL
EOSINOPHIL # BLD AUTO: 0.1 K/UL (ref 0–0.5)
EOSINOPHIL NFR BLD: 1 % (ref 0–8)
ERYTHROCYTE [DISTWIDTH] IN BLOOD BY AUTOMATED COUNT: 12.9 % (ref 11.5–14.5)
EST. GFR  (NO RACE VARIABLE): >60 ML/MIN/1.73 M^2
GLUCOSE SERPL-MCNC: 80 MG/DL (ref 70–110)
HCT VFR BLD AUTO: 35.6 % (ref 37–48.5)
HGB BLD-MCNC: 11.6 G/DL (ref 12–16)
IMM GRANULOCYTES # BLD AUTO: 0.06 K/UL (ref 0–0.04)
IMM GRANULOCYTES NFR BLD AUTO: 0.8 % (ref 0–0.5)
LDH SERPL L TO P-CCNC: 213 U/L (ref 110–260)
LYMPHOCYTES # BLD AUTO: 1.5 K/UL (ref 1–4.8)
LYMPHOCYTES NFR BLD: 19.3 % (ref 18–48)
MAGNESIUM SERPL-MCNC: 1.6 MG/DL (ref 1.6–2.6)
MCH RBC QN AUTO: 27.2 PG (ref 27–31)
MCHC RBC AUTO-ENTMCNC: 32.6 G/DL (ref 32–36)
MCV RBC AUTO: 84 FL (ref 82–98)
MONOCYTES # BLD AUTO: 0.7 K/UL (ref 0.3–1)
MONOCYTES NFR BLD: 9.4 % (ref 4–15)
NEUTROPHILS # BLD AUTO: 5.5 K/UL (ref 1.8–7.7)
NEUTROPHILS NFR BLD: 69.2 % (ref 38–73)
NRBC BLD-RTO: 0 /100 WBC
PLATELET # BLD AUTO: 340 K/UL (ref 150–450)
PMV BLD AUTO: 9.5 FL (ref 9.2–12.9)
POTASSIUM SERPL-SCNC: 3.6 MMOL/L (ref 3.5–5.1)
PROT SERPL-MCNC: 7 G/DL (ref 6–8.4)
PROT UR-MCNC: 13 MG/DL (ref 0–15)
PROT UR-MCNC: 9 MG/DL (ref 0–15)
PROT/CREAT UR: 0.18 MG/G{CREAT} (ref 0–0.2)
PROT/CREAT UR: 0.24 MG/G{CREAT} (ref 0–0.2)
RBC # BLD AUTO: 4.26 M/UL (ref 4–5.4)
SODIUM SERPL-SCNC: 136 MMOL/L (ref 136–145)
WBC # BLD AUTO: 7.91 K/UL (ref 3.9–12.7)

## 2024-07-02 PROCEDURE — 85025 COMPLETE CBC W/AUTO DIFF WBC: CPT | Mod: PO,ER | Performed by: EMERGENCY MEDICINE

## 2024-07-02 PROCEDURE — G0378 HOSPITAL OBSERVATION PER HR: HCPCS | Mod: PO,ER

## 2024-07-02 PROCEDURE — G0379 DIRECT REFER HOSPITAL OBSERV: HCPCS | Mod: PO,ER

## 2024-07-02 PROCEDURE — 25000003 PHARM REV CODE 250: Performed by: ADVANCED PRACTICE MIDWIFE

## 2024-07-02 PROCEDURE — 80053 COMPREHEN METABOLIC PANEL: CPT | Mod: PO,ER | Performed by: EMERGENCY MEDICINE

## 2024-07-02 PROCEDURE — 84156 ASSAY OF PROTEIN URINE: CPT | Mod: 91,PO,ER | Performed by: EMERGENCY MEDICINE

## 2024-07-02 PROCEDURE — 83615 LACTATE (LD) (LDH) ENZYME: CPT | Mod: PO,ER | Performed by: EMERGENCY MEDICINE

## 2024-07-02 PROCEDURE — 83735 ASSAY OF MAGNESIUM: CPT | Mod: PO,ER | Performed by: EMERGENCY MEDICINE

## 2024-07-02 PROCEDURE — 99211 OFF/OP EST MAY X REQ PHY/QHP: CPT

## 2024-07-02 PROCEDURE — 63600175 PHARM REV CODE 636 W HCPCS: Mod: ER | Performed by: EMERGENCY MEDICINE

## 2024-07-02 RX ORDER — ONDANSETRON 8 MG/1
8 TABLET, ORALLY DISINTEGRATING ORAL EVERY 8 HOURS PRN
Status: DISCONTINUED | OUTPATIENT
Start: 2024-07-02 | End: 2024-07-03 | Stop reason: HOSPADM

## 2024-07-02 RX ORDER — LABETALOL HYDROCHLORIDE 5 MG/ML
20 INJECTION, SOLUTION INTRAVENOUS
Status: COMPLETED | OUTPATIENT
Start: 2024-07-02 | End: 2024-07-02

## 2024-07-02 RX ORDER — SODIUM CHLORIDE, SODIUM LACTATE, POTASSIUM CHLORIDE, CALCIUM CHLORIDE 600; 310; 30; 20 MG/100ML; MG/100ML; MG/100ML; MG/100ML
INJECTION, SOLUTION INTRAVENOUS CONTINUOUS
Status: DISCONTINUED | OUTPATIENT
Start: 2024-07-02 | End: 2024-07-03 | Stop reason: HOSPADM

## 2024-07-02 RX ORDER — ACETAMINOPHEN 500 MG
500 TABLET ORAL EVERY 6 HOURS PRN
Status: DISCONTINUED | OUTPATIENT
Start: 2024-07-02 | End: 2024-07-03 | Stop reason: HOSPADM

## 2024-07-02 RX ORDER — METRONIDAZOLE 500 MG/1
500 TABLET ORAL EVERY 12 HOURS
Status: DISCONTINUED | OUTPATIENT
Start: 2024-07-02 | End: 2024-07-03 | Stop reason: HOSPADM

## 2024-07-02 RX ADMIN — METRONIDAZOLE 500 MG: 500 TABLET ORAL at 09:07

## 2024-07-02 RX ADMIN — LABETALOL HYDROCHLORIDE 20 MG: 5 INJECTION INTRAVENOUS at 07:07

## 2024-07-02 RX ADMIN — LABETALOL HYDROCHLORIDE 20 MG: 5 INJECTION INTRAVENOUS at 06:07

## 2024-07-02 NOTE — Clinical Note
Transfer To (Destination): [facility] LABOR AND DELIVERY [[id number]]   Diagnosis: Abdominal pain affecting pregnancy [[id number]]   Future Attending Provider: CADE MOYER [[id number]]

## 2024-07-02 NOTE — ED PROVIDER NOTES
Encounter Date: 2024       History     Chief Complaint   Patient presents with    Abdominal Pain     35 weeks preg. Due date  first preg. [facility]. C/o leaking fluid this am then started with lower abd pain 8/10 approx. 4pm today. Denies any bleeding     The history is provided by the patient.    35 weeks pregnant presents with leakage of fluid earlier today and abd pain since 4 pm, irregular contraction like pain to pelvis. .    Review of patient's allergies indicates:   Allergen Reactions    Iodine Swelling    Shrimp Swelling     Past Medical History:   Diagnosis Date    Anemia affecting pregnancy in second trimester 2024     No past surgical history on file.  Family History   Problem Relation Name Age of Onset    Hypertension Mother       Social History     Tobacco Use    Smoking status: Former     Types: Vaping with nicotine    Smokeless tobacco: Never   Substance Use Topics    Alcohol use: Yes     Comment: occassionally    Drug use: Never     Review of Systems   Constitutional:  Negative for fever.   HENT:  Negative for sore throat.    Respiratory:  Negative for shortness of breath.    Cardiovascular:  Negative for chest pain.   Gastrointestinal:  Negative for nausea.   Genitourinary:  Positive for pelvic pain. Negative for dysuria.   Musculoskeletal:  Negative for back pain.   Skin:  Negative for rash.   Neurological:  Negative for weakness.   Hematological:  Does not bruise/bleed easily.       Physical Exam     Initial Vitals [24 1831]   BP Pulse Resp Temp SpO2   (!) 178/99 93 20 97.1 °F (36.2 °C) 99 %      MAP       --         Physical Exam    Nursing note and vitals reviewed.  Constitutional: She appears well-developed and well-nourished. No distress.   HENT:   Head: Normocephalic and atraumatic.   Mouth/Throat: Oropharynx is clear and moist.   Eyes: Conjunctivae and EOM are normal. Pupils are equal, round, and reactive to light.   Neck: Neck supple.   Normal range of  motion.  Cardiovascular:  Normal rate, regular rhythm and normal heart sounds.           Pulmonary/Chest: Breath sounds normal. No respiratory distress.   Abdominal: Abdomen is soft. Bowel sounds are normal. She exhibits no distension. There is no abdominal tenderness.   Gravid    Musculoskeletal:         General: Normal range of motion.      Cervical back: Normal range of motion and neck supple.     Neurological: She is alert and oriented to person, place, and time. She has normal strength.   Skin: Skin is warm and dry.   Psychiatric: She has a normal mood and affect. Thought content normal.     1-2cm       ED Course   Procedures  Labs Reviewed   CREATININE, URINE, RANDOM   CBC W/ AUTO DIFFERENTIAL   COMPREHENSIVE METABOLIC PANEL   MAGNESIUM   LACTATE DEHYDROGENASE          Imaging Results    None          Medications   labetaloL injection 20 mg (has no administration in time range)     Medical Decision Making  DDx Abd pain affecting pregnancy, Labor, Delivery    Amount and/or Complexity of Data Reviewed  Labs: ordered.  Discussion of management or test interpretation with external provider(s): OB consult- discussed c Dr Walsh, will give Labetalol, ordered lab work. Send to L&D for fetal monitoring    Risk  Prescription drug management.  Decision regarding hospitalization.                                      Clinical Impression:  Final diagnoses:  [O26.899, R10.9] Abdominal pain affecting pregnancy (Primary)          ED Disposition Condition    Send to L&D Stable

## 2024-07-03 VITALS
HEART RATE: 88 BPM | SYSTOLIC BLOOD PRESSURE: 128 MMHG | OXYGEN SATURATION: 100 % | RESPIRATION RATE: 18 BRPM | TEMPERATURE: 97 F | DIASTOLIC BLOOD PRESSURE: 81 MMHG | WEIGHT: 249.56 LBS | BODY MASS INDEX: 44.21 KG/M2

## 2024-07-03 DIAGNOSIS — B96.89 BV (BACTERIAL VAGINOSIS): Primary | ICD-10-CM

## 2024-07-03 DIAGNOSIS — N76.0 BV (BACTERIAL VAGINOSIS): Primary | ICD-10-CM

## 2024-07-03 PROCEDURE — 59025 FETAL NON-STRESS TEST: CPT | Mod: 26,,, | Performed by: ADVANCED PRACTICE MIDWIFE

## 2024-07-03 PROCEDURE — 59025 FETAL NON-STRESS TEST: CPT

## 2024-07-03 PROCEDURE — 99214 OFFICE O/P EST MOD 30 MIN: CPT | Mod: 25,TH,, | Performed by: ADVANCED PRACTICE MIDWIFE

## 2024-07-03 RX ORDER — METRONIDAZOLE 500 MG/1
500 TABLET ORAL EVERY 12 HOURS
Qty: 14 TABLET | Refills: 0 | Status: SHIPPED | OUTPATIENT
Start: 2024-07-03 | End: 2024-07-10

## 2024-07-03 NOTE — DISCHARGE SUMMARY
[facility] - Labor & Delivery  Obstetrics  Discharge Summary      Patient Name: Violeta Bradford  MRN: [id number]  Admission Date: 2024  Hospital Length of Stay: 0 days  Discharge Date and Time: No discharge date for patient encounter.  Attending Physician: Georgie Walsh MD   Discharging Provider: Marichuy Steele CNM   Primary Care Provider: Nayely, Primary Doctor    HPI: 23 y.o.  at 35w1d presents to L&D via EMS from the ER in [facility] for rule out rupture. She states she feels like she has been leaking fluid since this am.     FHT: 135 Cat 1 (reassuring)  TOCO:  Q none minutes    * No surgery found *     Hospital Course:   SSE reveals BV.   BP elevated in [facility]. Was given labetalol in ambulance. PCR pending. Will monitor BP overnight and start treatment for BV.     BP remains within normal range and PCR normal. D/C home with STRICT precautions. Has f/u in clinic on Friday.          Final Active Diagnoses:    Diagnosis Date Noted POA    PRINCIPAL PROBLEM:  BV (bacterial vaginosis) [N76.0, B96.89] 2024 Unknown    Elevated blood pressure affecting pregnancy in third trimester, antepartum [O16.3] 2024 Unknown      Problems Resolved During this Admission:        Significant Diagnostic Studies: Labs: All labs within the past 24 hours have been reviewed      Immunizations       None            This patient has no babies on file.  Pending Diagnostic Studies:       None            Discharged Condition: stable    Disposition: Home or Self Care    Follow Up:   Follow-up Information       Adriana Judge CNM Follow up in 3 day(s).    Specialty: Obstetrics and Gynecology  Contact information:  [address]  [place] [address] [phone]                           Patient Instructions:      Notify your health care provider if you experience any of the following:  temperature >100.4     Notify your health care provider if you experience any of the following:  persistent nausea and vomiting or  diarrhea     Notify your health care provider if you experience any of the following:  severe uncontrolled pain     Notify your health care provider if you experience any of the following:  difficulty breathing or increased cough     Notify your health care provider if you experience any of the following:  severe persistent headache     Medications:  Current Discharge Medication List        CONTINUE these medications which have NOT CHANGED    Details   aspirin 81 MG Chew Take 1 tablet (81 mg total) by mouth once daily.  Qty: 30 tablet, Refills: 6      ferrous sulfate 325 (65 FE) MG EC tablet Take 1 tablet by mouth twice daily  Qty: 60 tablet, Refills: 0      metroNIDAZOLE (FLAGYL) 500 MG tablet Take 1 tablet (500 mg total) by mouth every 12 (twelve) hours. for 7 days  Qty: 14 tablet, Refills: 0    Associated Diagnoses: BV (bacterial vaginosis)      ondansetron (ZOFRAN-ODT) 4 MG TbDL Take 1 tablet (4 mg total) by mouth every 6 (six) hours as needed.  Qty: 15 tablet, Refills: 0      pantoprazole (PROTONIX) 40 MG tablet Take 40 mg by mouth every morning.      prenatal vit no.124/iron/folic (PRENATAL VITAMIN ORAL) Take by mouth.             Marichuy Steele CNM  Obstetrics  [facility] - Labor & Delivery

## 2024-07-03 NOTE — PROCEDURES
Violeta Bradford is a 23 y.o. female patient.    Temp: 97.1 °F (36.2 °C) (07/02/24 1831)  Pulse: 87 (07/03/24 0400)  Resp: 18 (07/02/24 1928)  BP: 117/72 (07/03/24 0400)  SpO2: 100 % (07/02/24 2203)  Weight: 113.2 kg (249 lb 9 oz) (07/02/24 1831)       Obtain Fetal nonstress test (NST)    Date/Time: 7/3/2024 4:21 AM    Performed by: Marichuy Steele CNM  Authorized by: Marichuy Steele CNM    Nonstress Test:     Variability:  6-25 BPM    Decelerations:  None    Accelerations:  15 bpm    Acoustic Stimulator: No      Baseline:  135    Uterine Irritability: No      Contractions:  Not present  Biophysical Profile:     Nonstress Test Interpretation: reactive      Overall Impression:  Reassuring  Post-procedure:     Patient tolerance:  Patient tolerated the procedure well with no immediate complications      7/3/2024

## 2024-07-03 NOTE — ASSESSMENT & PLAN NOTE
SSE reveals BV.   BP elevated in [place]. Was given labetalol in ambulance. PCR pending. Will monitor BP overnight and start treatment for BV.

## 2024-07-03 NOTE — HOSPITAL COURSE
SSE reveals BV.   BP elevated in [place]. Was given labetalol in ambulance. PCR pending. Will monitor BP overnight and start treatment for BV.     BP remains within normal range and PCR normal. D/C home with STRICT precautions. Has f/u in clinic on Friday.

## 2024-07-03 NOTE — HPI
23 y.o.  at 35w1d presents to L&D via EMS from the ER in [facility] for rule out rupture. She states she feels like she has been leaking fluid since this am.

## 2024-07-03 NOTE — H&P
[facility] - Labor & Delivery  Obstetrics  History & Physical    Patient Name: Violeta Bradford  MRN: [id number]  Admission Date: 2024  Primary Care Provider: Nayely, Primary Doctor    Subjective:     Principal Problem:BV (bacterial vaginosis)    History of Present Illness:  23 y.o.  at 35w1d presents to L&D via EMS from the ER in [facility] for rule out rupture. She states she feels like she has been leaking fluid since this am.     Obstetric HPI:  Patient reports None contractions, active fetal movement, No vaginal bleeding , possible loss of fluid     This pregnancy has been complicated by   Obesity  anemia    OB History    Para Term  AB Living   1 0 0 0 0 0   SAB IAB Ectopic Multiple Live Births   0 0 0 0 0      # Outcome Date GA Lbr Ozzy/2nd Weight Sex Type Anes PTL Lv   1 Current              Past Medical History:   Diagnosis Date    Anemia affecting pregnancy in second trimester 2024     No past surgical history on file.    PTA Medications   Medication Sig    aspirin 81 MG Chew Take 1 tablet (81 mg total) by mouth once daily. (Patient not taking: Reported on 2024)    ferrous sulfate 325 (65 FE) MG EC tablet Take 1 tablet by mouth twice daily    ondansetron (ZOFRAN-ODT) 4 MG TbDL Take 1 tablet (4 mg total) by mouth every 6 (six) hours as needed.    pantoprazole (PROTONIX) 40 MG tablet Take 40 mg by mouth every morning.    prenatal vit no.124/iron/folic (PRENATAL VITAMIN ORAL) Take by mouth.       Review of patient's allergies indicates:   Allergen Reactions    Iodine Swelling    Shrimp Swelling        Family History       Problem Relation (Age of Onset)    Hypertension Mother          Tobacco Use    Smoking status: Former     Types: Vaping with nicotine    Smokeless tobacco: Never   Substance and Sexual Activity    Alcohol use: Yes     Comment: occassionally    Drug use: Never    Sexual activity: Yes     Partners: Male     Review of Systems   Respiratory:  Negative for shortness of breath.     Cardiovascular:  Negative for chest pain.   Gastrointestinal:  Negative for nausea and vomiting.   Genitourinary:  Positive for vaginal discharge. Negative for vaginal bleeding.   Neurological:  Negative for headaches.   All other systems reviewed and are negative.     Objective:     Vital Signs (Most Recent):  Temp: 97.1 °F (36.2 °C) (24 183)  Pulse: 94 (24)  Resp: 18 (24)  BP: (!) 141/82 (24)  SpO2: 100 % (24) Vital Signs (24h Range):  Temp:  [97.1 °F (36.2 °C)] 97.1 °F (36.2 °C)  Pulse:  [93-98] 94  Resp:  [18-20] 18  SpO2:  [99 %-100 %] 100 %  BP: (141-178)/(82-99) 141/82     Weight: 113.2 kg (249 lb 9 oz)  Body mass index is 44.21 kg/m².    FHT: 135 Cat 1 (reassuring)  TOCO:  Q none minutes     Physical Exam:   Constitutional: She is oriented to person, place, and time. She appears well-developed and well-nourished.    HENT:   Head: Normocephalic.      Cardiovascular:  Normal rate, regular rhythm and normal heart sounds.             Pulmonary/Chest: Effort normal and breath sounds normal.        Abdominal: Soft.     Genitourinary:    Uterus normal.      Genitourinary Comments: SSE reveals THICK yellow discharge coating cervix and vaginal side walls. Wet prep shos copious clue cells. Negative for ferning. Nitrazine negative. Pooling negative.              Musculoskeletal: Normal range of motion and moves all extremeties.       Neurological: She is alert and oriented to person, place, and time. She has normal reflexes.    Skin: Skin is warm and dry.         Cervix:  Dilation:  0  Effacement:  Thick        Significant Labs:  Lab Results   Component Value Date    GROUPTRH O POS 2024    HEPBSAG Non-reactive 2024       I have personallly reviewed all pertinent lab results from the last 24 hours.  Assessment/Plan:     23 y.o. female  at 35w1d for:    * BV (bacterial vaginosis)  Rx for flagyll    Elevated blood pressure affecting pregnancy in third  trimester, antepartum  SSE reveals BV.   BP elevated in plaquimine. Was given labetalol in ambulance. PCR pending. Will monitor BP overnight and start treatment for BV.         Marichuy Steele CNM  Obstetrics  [facility] - [facility]

## 2024-07-03 NOTE — SUBJECTIVE & OBJECTIVE
Obstetric HPI:  Patient reports None contractions, active fetal movement, No vaginal bleeding , possible loss of fluid     This pregnancy has been complicated by   Obesity  anemia    OB History    Para Term  AB Living   1 0 0 0 0 0   SAB IAB Ectopic Multiple Live Births   0 0 0 0 0      # Outcome Date GA Lbr Ozzy/2nd Weight Sex Type Anes PTL Lv   1 Current              Past Medical History:   Diagnosis Date    Anemia affecting pregnancy in second trimester 2024     No past surgical history on file.    PTA Medications   Medication Sig    aspirin 81 MG Chew Take 1 tablet (81 mg total) by mouth once daily. (Patient not taking: Reported on 2024)    ferrous sulfate 325 (65 FE) MG EC tablet Take 1 tablet by mouth twice daily    ondansetron (ZOFRAN-ODT) 4 MG TbDL Take 1 tablet (4 mg total) by mouth every 6 (six) hours as needed.    pantoprazole (PROTONIX) 40 MG tablet Take 40 mg by mouth every morning.    prenatal vit no.124/iron/folic (PRENATAL VITAMIN ORAL) Take by mouth.       Review of patient's allergies indicates:   Allergen Reactions    Iodine Swelling    Shrimp Swelling        Family History       Problem Relation (Age of Onset)    Hypertension Mother          Tobacco Use    Smoking status: Former     Types: Vaping with nicotine    Smokeless tobacco: Never   Substance and Sexual Activity    Alcohol use: Yes     Comment: occassionally    Drug use: Never    Sexual activity: Yes     Partners: Male     Review of Systems   Respiratory:  Negative for shortness of breath.    Cardiovascular:  Negative for chest pain.   Gastrointestinal:  Negative for nausea and vomiting.   Genitourinary:  Positive for vaginal discharge. Negative for vaginal bleeding.   Neurological:  Negative for headaches.   All other systems reviewed and are negative.     Objective:     Vital Signs (Most Recent):  Temp: 97.1 °F (36.2 °C) (24 1831)  Pulse: 94 (24)  Resp: 18 (24)  BP: (!) 141/82 (24  1945)  SpO2: 100 % (07/02/24 1928) Vital Signs (24h Range):  Temp:  [97.1 °F (36.2 °C)] 97.1 °F (36.2 °C)  Pulse:  [93-98] 94  Resp:  [18-20] 18  SpO2:  [99 %-100 %] 100 %  BP: (141-178)/(82-99) 141/82     Weight: 113.2 kg (249 lb 9 oz)  Body mass index is 44.21 kg/m².    FHT: 135 Cat 1 (reassuring)  TOCO:  Q none minutes     Physical Exam:   Constitutional: She is oriented to person, place, and time. She appears well-developed and well-nourished.    HENT:   Head: Normocephalic.      Cardiovascular:  Normal rate, regular rhythm and normal heart sounds.             Pulmonary/Chest: Effort normal and breath sounds normal.        Abdominal: Soft.     Genitourinary:    Uterus normal.      Genitourinary Comments: SSE reveals THICK yellow discharge coating cervix and vaginal side walls. Wet prep shos copious clue cells. Negative for ferning. Nitrazine negative. Pooling negative.              Musculoskeletal: Normal range of motion and moves all extremeties.       Neurological: She is alert and oriented to person, place, and time. She has normal reflexes.    Skin: Skin is warm and dry.         Cervix:  Dilation:  0  Effacement:  Thick        Significant Labs:  Lab Results   Component Value Date    GROUPTRH O POS 02/06/2024    HEPBSAG Non-reactive 02/06/2024       I have personallly reviewed all pertinent lab results from the last 24 hours.

## 2024-07-05 ENCOUNTER — ROUTINE PRENATAL (OUTPATIENT)
Dept: OBSTETRICS AND GYNECOLOGY | Facility: CLINIC | Age: 24
End: 2024-07-05
Payer: MEDICAID

## 2024-07-05 VITALS
WEIGHT: 250.25 LBS | DIASTOLIC BLOOD PRESSURE: 82 MMHG | SYSTOLIC BLOOD PRESSURE: 134 MMHG | BODY MASS INDEX: 44.32 KG/M2

## 2024-07-05 DIAGNOSIS — O23.599 BACTERIAL VAGINOSIS IN PREGNANCY: ICD-10-CM

## 2024-07-05 DIAGNOSIS — O26.03 EXCESSIVE WEIGHT GAIN DURING PREGNANCY IN THIRD TRIMESTER: ICD-10-CM

## 2024-07-05 DIAGNOSIS — O99.013 ANEMIA AFFECTING PREGNANCY IN THIRD TRIMESTER: ICD-10-CM

## 2024-07-05 DIAGNOSIS — B96.89 BACTERIAL VAGINOSIS IN PREGNANCY: ICD-10-CM

## 2024-07-05 DIAGNOSIS — Z3A.35 35 WEEKS GESTATION OF PREGNANCY: Primary | ICD-10-CM

## 2024-07-05 PROCEDURE — 99213 OFFICE O/P EST LOW 20 MIN: CPT | Mod: TH,S$PBB,, | Performed by: MIDWIFE

## 2024-07-05 PROCEDURE — 99212 OFFICE O/P EST SF 10 MIN: CPT | Mod: PBBFAC,TH | Performed by: MIDWIFE

## 2024-07-05 PROCEDURE — 99999 PR PBB SHADOW E&M-EST. PATIENT-LVL II: CPT | Mod: PBBFAC,,, | Performed by: MIDWIFE

## 2024-07-06 NOTE — PROGRESS NOTES
35 weeks gestation of pregnancy    Doing well, no complaints  Good fetal movement  Denies contractions, VB, LOF  Declines contraception PP  GBS handout reviewed, collect at 36 weeks  Kick counts and PTL precautions  Begin weekly clinic visits    Anemia affecting pregnancy in third trimester    Po iron BID    Bacterial vaginosis in pregnancy    States symptoms much better since starting Flagyl    Excessive weight gain during pregnancy in third trimester     63lb 14.9oz   Growth scan 7/1 reviewed: VTX, EFW 2682g, growth 39%tile with AC 81%, MVP 6.3cm.

## 2024-07-11 ENCOUNTER — ROUTINE PRENATAL (OUTPATIENT)
Dept: OBSTETRICS AND GYNECOLOGY | Facility: CLINIC | Age: 24
End: 2024-07-11
Payer: MEDICAID

## 2024-07-11 VITALS
DIASTOLIC BLOOD PRESSURE: 84 MMHG | WEIGHT: 257.06 LBS | BODY MASS INDEX: 45.54 KG/M2 | SYSTOLIC BLOOD PRESSURE: 136 MMHG

## 2024-07-11 DIAGNOSIS — O99.213 OBESITY AFFECTING PREGNANCY IN THIRD TRIMESTER, UNSPECIFIED OBESITY TYPE: ICD-10-CM

## 2024-07-11 DIAGNOSIS — Z34.03 SUPERVISION OF NORMAL FIRST PREGNANCY IN THIRD TRIMESTER: Primary | ICD-10-CM

## 2024-07-11 PROCEDURE — 87081 CULTURE SCREEN ONLY: CPT | Performed by: ADVANCED PRACTICE MIDWIFE

## 2024-07-11 PROCEDURE — 99212 OFFICE O/P EST SF 10 MIN: CPT | Mod: PBBFAC,TH | Performed by: ADVANCED PRACTICE MIDWIFE

## 2024-07-11 PROCEDURE — 99214 OFFICE O/P EST MOD 30 MIN: CPT | Mod: TH,S$PBB,UC, | Performed by: ADVANCED PRACTICE MIDWIFE

## 2024-07-11 PROCEDURE — 99999 PR PBB SHADOW E&M-EST. PATIENT-LVL II: CPT | Mod: PBBFAC,,, | Performed by: ADVANCED PRACTICE MIDWIFE

## 2024-07-11 NOTE — PROGRESS NOTES
23 y.o. female  at 36w3d  Reports + FM, denies VB, LOF or regular CTX  Doing well without concerns. Discussed healthy dietary choices. Discussed s/s of [place]. Discussed signs of labor, when and where to go for L&D.     TW lbs   GBS collected today   The skin of the suprapubic region was evaluated and appears clean dry intact.    VE per pt request 0.5/50/-4    US for growth done on     Supervision of normal first pregnancy in third trimester  -     Strep B Screen, Vaginal / Rectal    Obesity affecting pregnancy in third trimester, unspecified obesity type         Reviewed warning signs, normal FKCs, labor precautions and how/when to call.  RTC x 1 wk, call or present sooner prn.       JUNITO Martell

## 2024-07-13 LAB — BACTERIA SPEC AEROBE CULT: NORMAL

## 2024-07-16 RX ORDER — FERROUS SULFATE 325(65) MG
TABLET, DELAYED RELEASE (ENTERIC COATED) ORAL 2 TIMES DAILY
Qty: 60 TABLET | Refills: 0 | Status: SHIPPED | OUTPATIENT
Start: 2024-07-16

## 2024-07-18 ENCOUNTER — ROUTINE PRENATAL (OUTPATIENT)
Dept: OBSTETRICS AND GYNECOLOGY | Facility: CLINIC | Age: 24
End: 2024-07-18
Payer: MEDICAID

## 2024-07-18 VITALS
BODY MASS INDEX: 45.14 KG/M2 | SYSTOLIC BLOOD PRESSURE: 132 MMHG | DIASTOLIC BLOOD PRESSURE: 92 MMHG | WEIGHT: 254.88 LBS

## 2024-07-18 DIAGNOSIS — K31.84 GASTROPARESIS: ICD-10-CM

## 2024-07-18 DIAGNOSIS — O26.03 EXCESSIVE WEIGHT GAIN DURING PREGNANCY IN THIRD TRIMESTER: ICD-10-CM

## 2024-07-18 DIAGNOSIS — O99.013 ANEMIA AFFECTING PREGNANCY IN THIRD TRIMESTER: ICD-10-CM

## 2024-07-18 DIAGNOSIS — Z3A.37 37 WEEKS GESTATION OF PREGNANCY: Primary | ICD-10-CM

## 2024-07-18 DIAGNOSIS — O99.213 OBESITY AFFECTING PREGNANCY IN THIRD TRIMESTER, UNSPECIFIED OBESITY TYPE: ICD-10-CM

## 2024-07-18 PROCEDURE — 99212 OFFICE O/P EST SF 10 MIN: CPT | Mod: PBBFAC,TH

## 2024-07-18 PROCEDURE — 99214 OFFICE O/P EST MOD 30 MIN: CPT | Mod: TH,S$PBB,UC,

## 2024-07-18 PROCEDURE — 99999 PR PBB SHADOW E&M-EST. PATIENT-LVL II: CPT | Mod: PBBFAC,,,

## 2024-07-18 RX ORDER — PANTOPRAZOLE SODIUM 20 MG/1
40 TABLET, DELAYED RELEASE ORAL DAILY
Qty: 30 TABLET | Refills: 3 | Status: SHIPPED | OUTPATIENT
Start: 2024-07-18

## 2024-07-19 NOTE — PROGRESS NOTES
23 y.o. female  at 37w3d   Reports + FM, denies VB, LOF or regular CTX  Doing well without concerns. BP in 130s/90s. Denies pre-E s/s. Reviewed pre-E s/s. Notify if experiencing dizziness, headache, blurred vision, swelling in legs, and/or epigastric pain. Please take blood pressure if able. If blood pressure is greater than 140/90 repeat blood pressure again in 15 min.  If still greater than 140/90 notify provider. If unable to take blood pressure please call the office or report to labor and delivery for evaluation.     Life style modifications that can reduce blood pressure during pregnancy: low sodium diet, 30 min exercise per day, adequate hydration  Make sure that you have been at rest for at least 20 min, your legs are not crossed, and your arm is at the level of your heart (like resting on a table) when you take your blood pressure.      TW lbs   GBS: Negative    37 weeks gestation of pregnancy  - routine PNC   - monitor BP closely  Gastroparesis  -     pantoprazole (PROTONIX) 20 MG tablet; Take 2 tablets (40 mg total) by mouth once daily.  Dispense: 30 tablet; Refill: 3    Obesity affecting pregnancy in third trimester, unspecified obesity type    Excessive weight gain during pregnancy in third trimester    Anemia affecting pregnancy in third trimester  - iron BID      Reviewed warning signs, normal FKCs, labor precautions and how/when to call. Patient states understanding  RTC x 1 wks, call or present sooner prn

## 2024-07-25 ENCOUNTER — LAB VISIT (OUTPATIENT)
Dept: LAB | Facility: HOSPITAL | Age: 24
End: 2024-07-25
Payer: MEDICAID

## 2024-07-25 ENCOUNTER — ROUTINE PRENATAL (OUTPATIENT)
Dept: OBSTETRICS AND GYNECOLOGY | Facility: CLINIC | Age: 24
End: 2024-07-25
Payer: MEDICAID

## 2024-07-25 VITALS
HEART RATE: 95 BPM | WEIGHT: 259.69 LBS | SYSTOLIC BLOOD PRESSURE: 128 MMHG | BODY MASS INDEX: 46 KG/M2 | DIASTOLIC BLOOD PRESSURE: 62 MMHG

## 2024-07-25 DIAGNOSIS — Z34.90 ENCOUNTER FOR ELECTIVE INDUCTION OF LABOR: ICD-10-CM

## 2024-07-25 DIAGNOSIS — O26.03 EXCESSIVE WEIGHT GAIN DURING PREGNANCY IN THIRD TRIMESTER: ICD-10-CM

## 2024-07-25 DIAGNOSIS — R03.0 ELEVATED BLOOD PRESSURE READING: ICD-10-CM

## 2024-07-25 DIAGNOSIS — O99.013 ANEMIA AFFECTING PREGNANCY IN THIRD TRIMESTER: ICD-10-CM

## 2024-07-25 DIAGNOSIS — K31.84 GASTROPARESIS: ICD-10-CM

## 2024-07-25 DIAGNOSIS — Z3A.38 38 WEEKS GESTATION OF PREGNANCY: Primary | ICD-10-CM

## 2024-07-25 DIAGNOSIS — O99.213 OBESITY AFFECTING PREGNANCY IN THIRD TRIMESTER, UNSPECIFIED OBESITY TYPE: ICD-10-CM

## 2024-07-25 LAB
ALBUMIN SERPL BCP-MCNC: 2.6 G/DL (ref 3.5–5.2)
ALP SERPL-CCNC: 106 U/L (ref 55–135)
ALT SERPL W/O P-5'-P-CCNC: 20 U/L (ref 10–44)
ANION GAP SERPL CALC-SCNC: 11 MMOL/L (ref 8–16)
AST SERPL-CCNC: 17 U/L (ref 10–40)
BASOPHILS # BLD AUTO: 0.01 K/UL (ref 0–0.2)
BASOPHILS NFR BLD: 0.1 % (ref 0–1.9)
BILIRUB SERPL-MCNC: 0.1 MG/DL (ref 0.1–1)
BUN SERPL-MCNC: 7 MG/DL (ref 6–20)
CALCIUM SERPL-MCNC: 9.7 MG/DL (ref 8.7–10.5)
CHLORIDE SERPL-SCNC: 107 MMOL/L (ref 95–110)
CO2 SERPL-SCNC: 20 MMOL/L (ref 23–29)
CREAT SERPL-MCNC: 0.6 MG/DL (ref 0.5–1.4)
CREAT UR-MCNC: 108 MG/DL (ref 15–325)
DIFFERENTIAL METHOD BLD: ABNORMAL
EOSINOPHIL # BLD AUTO: 0.1 K/UL (ref 0–0.5)
EOSINOPHIL NFR BLD: 1 % (ref 0–8)
ERYTHROCYTE [DISTWIDTH] IN BLOOD BY AUTOMATED COUNT: 13.6 % (ref 11.5–14.5)
EST. GFR  (NO RACE VARIABLE): >60 ML/MIN/1.73 M^2
GLUCOSE SERPL-MCNC: 92 MG/DL (ref 70–110)
HCT VFR BLD AUTO: 36 % (ref 37–48.5)
HGB BLD-MCNC: 11.3 G/DL (ref 12–16)
IMM GRANULOCYTES # BLD AUTO: 0.07 K/UL (ref 0–0.04)
IMM GRANULOCYTES NFR BLD AUTO: 0.9 % (ref 0–0.5)
LYMPHOCYTES # BLD AUTO: 1.2 K/UL (ref 1–4.8)
LYMPHOCYTES NFR BLD: 15.2 % (ref 18–48)
MCH RBC QN AUTO: 26.8 PG (ref 27–31)
MCHC RBC AUTO-ENTMCNC: 31.4 G/DL (ref 32–36)
MCV RBC AUTO: 86 FL (ref 82–98)
MONOCYTES # BLD AUTO: 0.6 K/UL (ref 0.3–1)
MONOCYTES NFR BLD: 8 % (ref 4–15)
NEUTROPHILS # BLD AUTO: 5.7 K/UL (ref 1.8–7.7)
NEUTROPHILS NFR BLD: 74.8 % (ref 38–73)
NRBC BLD-RTO: 0 /100 WBC
PLATELET # BLD AUTO: 353 K/UL (ref 150–450)
PMV BLD AUTO: 9.7 FL (ref 9.2–12.9)
POTASSIUM SERPL-SCNC: 3.9 MMOL/L (ref 3.5–5.1)
PROT SERPL-MCNC: 7.2 G/DL (ref 6–8.4)
PROT UR-MCNC: 18 MG/DL (ref 0–15)
PROT/CREAT UR: 0.17 MG/G{CREAT} (ref 0–0.2)
RBC # BLD AUTO: 4.21 M/UL (ref 4–5.4)
SODIUM SERPL-SCNC: 138 MMOL/L (ref 136–145)
WBC # BLD AUTO: 7.65 K/UL (ref 3.9–12.7)

## 2024-07-25 PROCEDURE — 84156 ASSAY OF PROTEIN URINE: CPT

## 2024-07-25 PROCEDURE — 99999 PR PBB SHADOW E&M-EST. PATIENT-LVL II: CPT | Mod: PBBFAC,,,

## 2024-07-25 PROCEDURE — 82570 ASSAY OF URINE CREATININE: CPT

## 2024-07-25 PROCEDURE — 36415 COLL VENOUS BLD VENIPUNCTURE: CPT

## 2024-07-25 PROCEDURE — 99212 OFFICE O/P EST SF 10 MIN: CPT | Mod: PBBFAC,TH

## 2024-07-25 PROCEDURE — 80053 COMPREHEN METABOLIC PANEL: CPT

## 2024-07-25 PROCEDURE — 85025 COMPLETE CBC W/AUTO DIFF WBC: CPT

## 2024-07-25 NOTE — PROGRESS NOTES
23 y.o. female  at 38w3d   Reports + FM, denies VB, LOF or regular CTX  Doing well without concerns. Initial BP elevated today 152/94. Retake at end of visit 128/62 manual.  Denies pre-E s/s. No edema noted.  Patient mother does report having some elevated BP at home. Has been taking BP twice a day per provider request but did forget to take it this morning.  Discussed GHTN and pre-E with patient and mother.  Considering BP have been slightly elevated in office previously and elevated today will repeat pre-E work up. If abnormal will advise patient to go to LD for IOL. If normal will plan for delivery at 39w. Patient verbalized understanding and is agreeable with plan. Strict BP instructions given.   VE: soft/mid position//-3  TW lbs   GBS: Negative    38 weeks gestation of pregnancy  - routine PNC   Elevated blood pressure reading  -     IP OB Labor Induction; Future  -     CBC W/ AUTO DIFFERENTIAL; Future; Expected date: 2024  -     COMPREHENSIVE METABOLIC PANEL; Future; Expected date: 2024  -     Protein / creatinine ratio, urine  -     Reviewed pre-E s/s. Notify if experiencing dizziness, headache, blurred vision, swelling in legs, and/or epigastric pain. Please take blood pressure if able. If blood pressure is greater than 140/90 repeat blood pressure again in 15 min.  If still greater than 140/90 notify provider. If unable to take blood pressure please call the office or report to labor and delivery for evaluation.     Life style modifications that can reduce blood pressure during pregnancy: low sodium diet, 30 min exercise per day, adequate hydration  -     Make sure that you have been at rest for at least 20 min, your legs are not crossed, and your arm is at the level of your heart (like resting on a table) when you take your blood pressure.     -    IOL scheduled for 24 at 2000, orders placed.  -    will start with Cytotec then hilton balloon/pitocin    Anemia affecting  pregnancy in third trimester  - iron BID  Gastroparesis  - protonix 40mg   Obesity affecting pregnancy in third trimester, unspecified obesity type    Excessive weight gain during pregnancy in third trimester        Reviewed warning signs, normal FKCs, labor precautions and how/when to call. Patient states understanding  IOL 7/30/24, call or present sooner prn

## 2024-07-26 RX ORDER — CARBOPROST TROMETHAMINE 250 UG/ML
250 INJECTION, SOLUTION INTRAMUSCULAR
OUTPATIENT
Start: 2024-07-26

## 2024-07-26 RX ORDER — OXYTOCIN 10 [USP'U]/ML
10 INJECTION, SOLUTION INTRAMUSCULAR; INTRAVENOUS ONCE AS NEEDED
OUTPATIENT
Start: 2024-07-26 | End: 2035-12-22

## 2024-07-26 RX ORDER — OXYTOCIN-SODIUM CHLORIDE 0.9% IV SOLN 30 UNIT/500ML 30-0.9/5 UT/ML-%
95 SOLUTION INTRAVENOUS ONCE AS NEEDED
OUTPATIENT
Start: 2024-07-26 | End: 2035-12-22

## 2024-07-26 RX ORDER — OXYTOCIN-SODIUM CHLORIDE 0.9% IV SOLN 30 UNIT/500ML 30-0.9/5 UT/ML-%
95 SOLUTION INTRAVENOUS ONCE
OUTPATIENT
Start: 2024-07-26 | End: 2024-07-26

## 2024-07-26 RX ORDER — CALCIUM CARBONATE 200(500)MG
500 TABLET,CHEWABLE ORAL 3 TIMES DAILY PRN
OUTPATIENT
Start: 2024-07-26

## 2024-07-26 RX ORDER — OXYTOCIN-SODIUM CHLORIDE 0.9% IV SOLN 30 UNIT/500ML 30-0.9/5 UT/ML-%
10 SOLUTION INTRAVENOUS ONCE
OUTPATIENT
Start: 2024-07-26 | End: 2024-07-26

## 2024-07-26 RX ORDER — LIDOCAINE HYDROCHLORIDE 10 MG/ML
10 INJECTION INFILTRATION; PERINEURAL ONCE AS NEEDED
OUTPATIENT
Start: 2024-07-26 | End: 2035-12-22

## 2024-07-26 RX ORDER — METHYLERGONOVINE MALEATE 0.2 MG/ML
200 INJECTION INTRAVENOUS ONCE AS NEEDED
OUTPATIENT
Start: 2024-07-26 | End: 2035-12-22

## 2024-07-26 RX ORDER — MUPIROCIN 20 MG/G
OINTMENT TOPICAL
OUTPATIENT
Start: 2024-07-26

## 2024-07-26 RX ORDER — SIMETHICONE 80 MG
1 TABLET,CHEWABLE ORAL 4 TIMES DAILY PRN
OUTPATIENT
Start: 2024-07-26

## 2024-07-26 RX ORDER — MISOPROSTOL 200 UG/1
800 TABLET ORAL ONCE AS NEEDED
OUTPATIENT
Start: 2024-07-26

## 2024-07-26 RX ORDER — MISOPROSTOL 200 UG/1
800 TABLET ORAL ONCE AS NEEDED
OUTPATIENT
Start: 2024-07-26 | End: 2035-12-22

## 2024-07-26 RX ORDER — ONDANSETRON 8 MG/1
8 TABLET, ORALLY DISINTEGRATING ORAL EVERY 8 HOURS PRN
OUTPATIENT
Start: 2024-07-26

## 2024-07-26 RX ORDER — OXYTOCIN-SODIUM CHLORIDE 0.9% IV SOLN 30 UNIT/500ML 30-0.9/5 UT/ML-%
10 SOLUTION INTRAVENOUS ONCE AS NEEDED
OUTPATIENT
Start: 2024-07-26 | End: 2035-12-22

## 2024-07-26 RX ORDER — DIPHENOXYLATE HYDROCHLORIDE AND ATROPINE SULFATE 2.5; .025 MG/1; MG/1
2 TABLET ORAL EVERY 6 HOURS PRN
OUTPATIENT
Start: 2024-07-26

## 2024-07-26 RX ORDER — SODIUM CHLORIDE, SODIUM LACTATE, POTASSIUM CHLORIDE, CALCIUM CHLORIDE 600; 310; 30; 20 MG/100ML; MG/100ML; MG/100ML; MG/100ML
INJECTION, SOLUTION INTRAVENOUS CONTINUOUS
OUTPATIENT
Start: 2024-07-26

## 2024-07-26 RX ORDER — TERBUTALINE SULFATE 1 MG/ML
0.25 INJECTION SUBCUTANEOUS
OUTPATIENT
Start: 2024-07-26

## 2024-07-30 ENCOUNTER — HOSPITAL ENCOUNTER (INPATIENT)
Facility: HOSPITAL | Age: 24
LOS: 3 days | Discharge: HOME | End: 2024-08-02
Attending: OBSTETRICS & GYNECOLOGY | Admitting: OBSTETRICS & GYNECOLOGY
Payer: MEDICAID

## 2024-07-30 DIAGNOSIS — Z34.90 ENCOUNTER FOR ELECTIVE INDUCTION OF LABOR: ICD-10-CM

## 2024-07-30 DIAGNOSIS — R03.0 ELEVATED BLOOD PRESSURE READING: ICD-10-CM

## 2024-07-30 PROBLEM — O13.3 GESTATIONAL HYPERTENSION, THIRD TRIMESTER: Status: ACTIVE | Noted: 2024-07-30

## 2024-07-30 LAB
ABO + RH BLD: NORMAL
ALBUMIN SERPL BCP-MCNC: 2.6 G/DL (ref 3.5–5.2)
ALP SERPL-CCNC: 134 U/L (ref 55–135)
ALT SERPL W/O P-5'-P-CCNC: 19 U/L (ref 10–44)
ANION GAP SERPL CALC-SCNC: 12 MMOL/L (ref 8–16)
AST SERPL-CCNC: 17 U/L (ref 10–40)
BASOPHILS # BLD AUTO: 0.02 K/UL (ref 0–0.2)
BASOPHILS NFR BLD: 0.2 % (ref 0–1.9)
BILIRUB SERPL-MCNC: 0.2 MG/DL (ref 0.1–1)
BLD GP AB SCN CELLS X3 SERPL QL: NORMAL
BUN SERPL-MCNC: 6 MG/DL (ref 6–20)
CALCIUM SERPL-MCNC: 9.6 MG/DL (ref 8.7–10.5)
CHLORIDE SERPL-SCNC: 108 MMOL/L (ref 95–110)
CO2 SERPL-SCNC: 18 MMOL/L (ref 23–29)
CREAT SERPL-MCNC: 0.6 MG/DL (ref 0.5–1.4)
CREAT UR-MCNC: 143.2 MG/DL (ref 15–325)
DIFFERENTIAL METHOD BLD: ABNORMAL
EOSINOPHIL # BLD AUTO: 0.1 K/UL (ref 0–0.5)
EOSINOPHIL NFR BLD: 0.9 % (ref 0–8)
ERYTHROCYTE [DISTWIDTH] IN BLOOD BY AUTOMATED COUNT: 13.3 % (ref 11.5–14.5)
EST. GFR  (NO RACE VARIABLE): >60 ML/MIN/1.73 M^2
GLUCOSE SERPL-MCNC: 84 MG/DL (ref 70–110)
HCT VFR BLD AUTO: 35.9 % (ref 37–48.5)
HGB BLD-MCNC: 11.4 G/DL (ref 12–16)
HIV 1+2 AB+HIV1 P24 AG SERPL QL IA: NEGATIVE
IMM GRANULOCYTES # BLD AUTO: 0.1 K/UL (ref 0–0.04)
IMM GRANULOCYTES NFR BLD AUTO: 1.2 % (ref 0–0.5)
LYMPHOCYTES # BLD AUTO: 1.5 K/UL (ref 1–4.8)
LYMPHOCYTES NFR BLD: 17.5 % (ref 18–48)
MCH RBC QN AUTO: 26.8 PG (ref 27–31)
MCHC RBC AUTO-ENTMCNC: 31.8 G/DL (ref 32–36)
MCV RBC AUTO: 84 FL (ref 82–98)
MONOCYTES # BLD AUTO: 0.8 K/UL (ref 0.3–1)
MONOCYTES NFR BLD: 9 % (ref 4–15)
NEUTROPHILS # BLD AUTO: 6 K/UL (ref 1.8–7.7)
NEUTROPHILS NFR BLD: 71.2 % (ref 38–73)
NRBC BLD-RTO: 0 /100 WBC
PLATELET # BLD AUTO: 402 K/UL (ref 150–450)
PMV BLD AUTO: 9.6 FL (ref 9.2–12.9)
POTASSIUM SERPL-SCNC: 3.7 MMOL/L (ref 3.5–5.1)
PROT SERPL-MCNC: 7.3 G/DL (ref 6–8.4)
PROT UR-MCNC: 37 MG/DL (ref 0–15)
PROT/CREAT UR: 0.26 MG/G{CREAT} (ref 0–0.2)
RBC # BLD AUTO: 4.26 M/UL (ref 4–5.4)
SODIUM SERPL-SCNC: 138 MMOL/L (ref 136–145)
WBC # BLD AUTO: 8.45 K/UL (ref 3.9–12.7)

## 2024-07-30 PROCEDURE — 80053 COMPREHEN METABOLIC PANEL: CPT

## 2024-07-30 PROCEDURE — 80307 DRUG TEST PRSMV CHEM ANLYZR: CPT

## 2024-07-30 PROCEDURE — 86900 BLOOD TYPING SEROLOGIC ABO: CPT

## 2024-07-30 PROCEDURE — 59200 INSERT CERVICAL DILATOR: CPT

## 2024-07-30 PROCEDURE — 85025 COMPLETE CBC W/AUTO DIFF WBC: CPT

## 2024-07-30 PROCEDURE — 86901 BLOOD TYPING SEROLOGIC RH(D): CPT

## 2024-07-30 PROCEDURE — 86593 SYPHILIS TEST NON-TREP QUANT: CPT

## 2024-07-30 PROCEDURE — 84156 ASSAY OF PROTEIN URINE: CPT

## 2024-07-30 PROCEDURE — 72100002 HC LABOR CARE, 1ST 8 HOURS

## 2024-07-30 PROCEDURE — 87389 HIV-1 AG W/HIV-1&-2 AB AG IA: CPT

## 2024-07-30 PROCEDURE — 63600175 PHARM REV CODE 636 W HCPCS: Performed by: ADVANCED PRACTICE MIDWIFE

## 2024-07-30 PROCEDURE — 86850 RBC ANTIBODY SCREEN: CPT

## 2024-07-30 PROCEDURE — 11000001 HC ACUTE MED/SURG PRIVATE ROOM

## 2024-07-30 RX ORDER — OXYTOCIN 10 [USP'U]/ML
10 INJECTION, SOLUTION INTRAMUSCULAR; INTRAVENOUS ONCE AS NEEDED
Status: DISCONTINUED | OUTPATIENT
Start: 2024-07-30 | End: 2024-07-31

## 2024-07-30 RX ORDER — LABETALOL HYDROCHLORIDE 5 MG/ML
20 INJECTION, SOLUTION INTRAVENOUS ONCE AS NEEDED
Status: COMPLETED | OUTPATIENT
Start: 2024-07-30 | End: 2024-07-30

## 2024-07-30 RX ORDER — OXYTOCIN-SODIUM CHLORIDE 0.9% IV SOLN 30 UNIT/500ML 30-0.9/5 UT/ML-%
0-32 SOLUTION INTRAVENOUS CONTINUOUS
Status: DISCONTINUED | OUTPATIENT
Start: 2024-07-30 | End: 2024-07-31

## 2024-07-30 RX ORDER — LIDOCAINE HYDROCHLORIDE 10 MG/ML
10 INJECTION, SOLUTION EPIDURAL; INFILTRATION; INTRACAUDAL; PERINEURAL ONCE AS NEEDED
Status: DISCONTINUED | OUTPATIENT
Start: 2024-07-30 | End: 2024-07-31

## 2024-07-30 RX ORDER — OXYTOCIN-SODIUM CHLORIDE 0.9% IV SOLN 30 UNIT/500ML 30-0.9/5 UT/ML-%
95 SOLUTION INTRAVENOUS ONCE
Status: DISCONTINUED | OUTPATIENT
Start: 2024-07-30 | End: 2024-07-31

## 2024-07-30 RX ORDER — METHYLERGONOVINE MALEATE 0.2 MG/ML
200 INJECTION INTRAVENOUS ONCE AS NEEDED
Status: DISCONTINUED | OUTPATIENT
Start: 2024-07-30 | End: 2024-07-31

## 2024-07-30 RX ORDER — ONDANSETRON 8 MG/1
8 TABLET, ORALLY DISINTEGRATING ORAL EVERY 8 HOURS PRN
Status: DISCONTINUED | OUTPATIENT
Start: 2024-07-30 | End: 2024-07-31

## 2024-07-30 RX ORDER — SIMETHICONE 80 MG
1 TABLET,CHEWABLE ORAL 4 TIMES DAILY PRN
Status: DISCONTINUED | OUTPATIENT
Start: 2024-07-30 | End: 2024-07-31

## 2024-07-30 RX ORDER — MUPIROCIN 20 MG/G
OINTMENT TOPICAL
Status: DISCONTINUED | OUTPATIENT
Start: 2024-07-30 | End: 2024-07-31

## 2024-07-30 RX ORDER — TERBUTALINE SULFATE 1 MG/ML
0.25 INJECTION SUBCUTANEOUS
Status: DISCONTINUED | OUTPATIENT
Start: 2024-07-30 | End: 2024-07-31

## 2024-07-30 RX ORDER — MISOPROSTOL 200 UG/1
800 TABLET ORAL ONCE AS NEEDED
Status: DISCONTINUED | OUTPATIENT
Start: 2024-07-30 | End: 2024-07-31

## 2024-07-30 RX ORDER — CARBOPROST TROMETHAMINE 250 UG/ML
250 INJECTION, SOLUTION INTRAMUSCULAR
Status: DISCONTINUED | OUTPATIENT
Start: 2024-07-30 | End: 2024-07-31

## 2024-07-30 RX ORDER — SODIUM CHLORIDE, SODIUM LACTATE, POTASSIUM CHLORIDE, CALCIUM CHLORIDE 600; 310; 30; 20 MG/100ML; MG/100ML; MG/100ML; MG/100ML
INJECTION, SOLUTION INTRAVENOUS CONTINUOUS
Status: DISCONTINUED | OUTPATIENT
Start: 2024-07-30 | End: 2024-07-31

## 2024-07-30 RX ORDER — OXYTOCIN-SODIUM CHLORIDE 0.9% IV SOLN 30 UNIT/500ML 30-0.9/5 UT/ML-%
10 SOLUTION INTRAVENOUS ONCE
Status: DISCONTINUED | OUTPATIENT
Start: 2024-07-30 | End: 2024-07-31

## 2024-07-30 RX ORDER — TRANEXAMIC ACID 10 MG/ML
1000 INJECTION, SOLUTION INTRAVENOUS EVERY 30 MIN PRN
Status: DISCONTINUED | OUTPATIENT
Start: 2024-07-30 | End: 2024-07-31

## 2024-07-30 RX ORDER — LABETALOL HYDROCHLORIDE 5 MG/ML
80 INJECTION, SOLUTION INTRAVENOUS ONCE AS NEEDED
Status: DISCONTINUED | OUTPATIENT
Start: 2024-07-30 | End: 2024-07-31

## 2024-07-30 RX ORDER — HYDRALAZINE HYDROCHLORIDE 20 MG/ML
10 INJECTION INTRAMUSCULAR; INTRAVENOUS ONCE AS NEEDED
Status: DISCONTINUED | OUTPATIENT
Start: 2024-07-30 | End: 2024-07-31

## 2024-07-30 RX ORDER — OXYTOCIN-SODIUM CHLORIDE 0.9% IV SOLN 30 UNIT/500ML 30-0.9/5 UT/ML-%
10 SOLUTION INTRAVENOUS ONCE AS NEEDED
Status: DISCONTINUED | OUTPATIENT
Start: 2024-07-30 | End: 2024-07-31

## 2024-07-30 RX ORDER — OXYTOCIN-SODIUM CHLORIDE 0.9% IV SOLN 30 UNIT/500ML 30-0.9/5 UT/ML-%
95 SOLUTION INTRAVENOUS ONCE AS NEEDED
Status: DISCONTINUED | OUTPATIENT
Start: 2024-07-30 | End: 2024-07-31

## 2024-07-30 RX ORDER — LABETALOL HYDROCHLORIDE 5 MG/ML
40 INJECTION, SOLUTION INTRAVENOUS ONCE AS NEEDED
Status: COMPLETED | OUTPATIENT
Start: 2024-07-30 | End: 2024-07-30

## 2024-07-30 RX ORDER — CALCIUM CARBONATE 200(500)MG
500 TABLET,CHEWABLE ORAL 3 TIMES DAILY PRN
Status: DISCONTINUED | OUTPATIENT
Start: 2024-07-30 | End: 2024-07-31

## 2024-07-30 RX ORDER — DIPHENOXYLATE HYDROCHLORIDE AND ATROPINE SULFATE 2.5; .025 MG/1; MG/1
2 TABLET ORAL EVERY 6 HOURS PRN
Status: DISCONTINUED | OUTPATIENT
Start: 2024-07-30 | End: 2024-07-31

## 2024-07-30 RX ADMIN — SODIUM CHLORIDE, POTASSIUM CHLORIDE, SODIUM LACTATE AND CALCIUM CHLORIDE: 600; 310; 30; 20 INJECTION, SOLUTION INTRAVENOUS at 09:07

## 2024-07-30 RX ADMIN — LABETALOL HYDROCHLORIDE 40 MG: 5 INJECTION INTRAVENOUS at 09:07

## 2024-07-30 RX ADMIN — LABETALOL HYDROCHLORIDE 20 MG: 5 INJECTION INTRAVENOUS at 09:07

## 2024-07-30 RX ADMIN — Medication 2 MILLI-UNITS/MIN: at 09:07

## 2024-07-31 ENCOUNTER — ANESTHESIA (OUTPATIENT)
Dept: OBSTETRICS AND GYNECOLOGY | Facility: HOSPITAL | Age: 24
End: 2024-07-31
Payer: MEDICAID

## 2024-07-31 ENCOUNTER — ANESTHESIA EVENT (OUTPATIENT)
Dept: OBSTETRICS AND GYNECOLOGY | Facility: HOSPITAL | Age: 24
End: 2024-07-31
Payer: MEDICAID

## 2024-07-31 LAB
AMPHET+METHAMPHET UR QL: NEGATIVE
BARBITURATES UR QL SCN>200 NG/ML: NEGATIVE
BENZODIAZ UR QL SCN>200 NG/ML: NEGATIVE
BZE UR QL SCN: NEGATIVE
CANNABINOIDS UR QL SCN: ABNORMAL
CREAT UR-MCNC: 143.2 MG/DL (ref 15–325)
METHADONE UR QL SCN>300 NG/ML: NEGATIVE
OPIATES UR QL SCN: NEGATIVE
PCP UR QL SCN>25 NG/ML: NEGATIVE
TOXICOLOGY INFORMATION: ABNORMAL
TREPONEMA PALLIDUM IGG+IGM AB [PRESENCE] IN SERUM OR PLASMA BY IMMUNOASSAY: NONREACTIVE

## 2024-07-31 PROCEDURE — 72200006 HC VAGINAL DELIVERY LEVEL III

## 2024-07-31 PROCEDURE — 59200 INSERT CERVICAL DILATOR: CPT

## 2024-07-31 PROCEDURE — 0HQ9XZZ REPAIR PERINEUM SKIN, EXTERNAL APPROACH: ICD-10-PCS

## 2024-07-31 PROCEDURE — 11000001 HC ACUTE MED/SURG PRIVATE ROOM

## 2024-07-31 PROCEDURE — 27200710 HC EPIDURAL INFUSION PUMP SET: Performed by: STUDENT IN AN ORGANIZED HEALTH CARE EDUCATION/TRAINING PROGRAM

## 2024-07-31 PROCEDURE — 72100003 HC LABOR CARE, EA. ADDL. 8 HRS

## 2024-07-31 PROCEDURE — 3E033VJ INTRODUCTION OF OTHER HORMONE INTO PERIPHERAL VEIN, PERCUTANEOUS APPROACH: ICD-10-PCS

## 2024-07-31 PROCEDURE — 27000181 HC CABLE, IUPC

## 2024-07-31 PROCEDURE — 27800516 HC TRAY, EPIDURAL COMBO: Performed by: STUDENT IN AN ORGANIZED HEALTH CARE EDUCATION/TRAINING PROGRAM

## 2024-07-31 PROCEDURE — 59409 OBSTETRICAL CARE: CPT | Mod: UC,GB,,

## 2024-07-31 PROCEDURE — 63600175 PHARM REV CODE 636 W HCPCS: Performed by: ADVANCED PRACTICE MIDWIFE

## 2024-07-31 PROCEDURE — 51702 INSERT TEMP BLADDER CATH: CPT

## 2024-07-31 PROCEDURE — 25000003 PHARM REV CODE 250

## 2024-07-31 PROCEDURE — 62326 NJX INTERLAMINAR LMBR/SAC: CPT | Performed by: NURSE ANESTHETIST, CERTIFIED REGISTERED

## 2024-07-31 PROCEDURE — 63600175 PHARM REV CODE 636 W HCPCS

## 2024-07-31 PROCEDURE — 3E0DXGC INTRODUCTION OF OTHER THERAPEUTIC SUBSTANCE INTO MOUTH AND PHARYNX, EXTERNAL APPROACH: ICD-10-PCS

## 2024-07-31 PROCEDURE — 63600175 PHARM REV CODE 636 W HCPCS: Performed by: STUDENT IN AN ORGANIZED HEALTH CARE EDUCATION/TRAINING PROGRAM

## 2024-07-31 PROCEDURE — 25000003 PHARM REV CODE 250: Performed by: NURSE ANESTHETIST, CERTIFIED REGISTERED

## 2024-07-31 RX ORDER — DIPHENHYDRAMINE HCL 25 MG
25 CAPSULE ORAL EVERY 4 HOURS PRN
Status: DISCONTINUED | OUTPATIENT
Start: 2024-07-31 | End: 2024-08-02 | Stop reason: HOSPADM

## 2024-07-31 RX ORDER — PRENATAL WITH FERROUS FUM AND FOLIC ACID 3080; 920; 120; 400; 22; 1.84; 3; 20; 10; 1; 12; 200; 27; 25; 2 [IU]/1; [IU]/1; MG/1; [IU]/1; MG/1; MG/1; MG/1; MG/1; MG/1; MG/1; UG/1; MG/1; MG/1; MG/1; MG/1
1 TABLET ORAL DAILY
Status: DISCONTINUED | OUTPATIENT
Start: 2024-08-01 | End: 2024-08-02 | Stop reason: HOSPADM

## 2024-07-31 RX ORDER — OXYTOCIN-SODIUM CHLORIDE 0.9% IV SOLN 30 UNIT/500ML 30-0.9/5 UT/ML-%
95 SOLUTION INTRAVENOUS ONCE
Status: DISCONTINUED | OUTPATIENT
Start: 2024-07-31 | End: 2024-08-02 | Stop reason: HOSPADM

## 2024-07-31 RX ORDER — LABETALOL HYDROCHLORIDE 5 MG/ML
80 INJECTION, SOLUTION INTRAVENOUS ONCE AS NEEDED
Status: DISCONTINUED | OUTPATIENT
Start: 2024-07-31 | End: 2024-07-31

## 2024-07-31 RX ORDER — TRANEXAMIC ACID 10 MG/ML
1000 INJECTION, SOLUTION INTRAVENOUS EVERY 30 MIN PRN
Status: DISCONTINUED | OUTPATIENT
Start: 2024-07-31 | End: 2024-08-02 | Stop reason: HOSPADM

## 2024-07-31 RX ORDER — HYDRALAZINE HYDROCHLORIDE 20 MG/ML
10 INJECTION INTRAMUSCULAR; INTRAVENOUS ONCE AS NEEDED
Status: DISCONTINUED | OUTPATIENT
Start: 2024-07-31 | End: 2024-07-31

## 2024-07-31 RX ORDER — MISOPROSTOL 200 UG/1
800 TABLET ORAL ONCE AS NEEDED
Status: DISCONTINUED | OUTPATIENT
Start: 2024-07-31 | End: 2024-08-02 | Stop reason: HOSPADM

## 2024-07-31 RX ORDER — HYDROCORTISONE 25 MG/G
CREAM TOPICAL 3 TIMES DAILY PRN
Status: DISCONTINUED | OUTPATIENT
Start: 2024-07-31 | End: 2024-08-02 | Stop reason: HOSPADM

## 2024-07-31 RX ORDER — ONDANSETRON HYDROCHLORIDE 2 MG/ML
4 INJECTION, SOLUTION INTRAVENOUS ONCE AS NEEDED
Status: COMPLETED | OUTPATIENT
Start: 2024-07-31 | End: 2024-07-31

## 2024-07-31 RX ORDER — DOCUSATE SODIUM 100 MG/1
200 CAPSULE, LIQUID FILLED ORAL 2 TIMES DAILY PRN
Status: DISCONTINUED | OUTPATIENT
Start: 2024-07-31 | End: 2024-08-02 | Stop reason: HOSPADM

## 2024-07-31 RX ORDER — CARBOPROST TROMETHAMINE 250 UG/ML
250 INJECTION, SOLUTION INTRAMUSCULAR
Status: DISCONTINUED | OUTPATIENT
Start: 2024-07-31 | End: 2024-08-02 | Stop reason: HOSPADM

## 2024-07-31 RX ORDER — ONDANSETRON 8 MG/1
8 TABLET, ORALLY DISINTEGRATING ORAL EVERY 8 HOURS PRN
Status: DISCONTINUED | OUTPATIENT
Start: 2024-07-31 | End: 2024-08-02 | Stop reason: HOSPADM

## 2024-07-31 RX ORDER — LABETALOL HYDROCHLORIDE 5 MG/ML
20 INJECTION, SOLUTION INTRAVENOUS ONCE AS NEEDED
Status: COMPLETED | OUTPATIENT
Start: 2024-07-31 | End: 2024-07-31

## 2024-07-31 RX ORDER — EPHEDRINE SULFATE 50 MG/ML
50 INJECTION, SOLUTION INTRAVENOUS ONCE AS NEEDED
Status: DISCONTINUED | OUTPATIENT
Start: 2024-07-31 | End: 2024-07-31

## 2024-07-31 RX ORDER — METHYLERGONOVINE MALEATE 0.2 MG/ML
200 INJECTION INTRAVENOUS ONCE AS NEEDED
Status: DISCONTINUED | OUTPATIENT
Start: 2024-07-31 | End: 2024-08-02 | Stop reason: HOSPADM

## 2024-07-31 RX ORDER — OXYTOCIN 10 [USP'U]/ML
10 INJECTION, SOLUTION INTRAMUSCULAR; INTRAVENOUS ONCE AS NEEDED
Status: DISCONTINUED | OUTPATIENT
Start: 2024-07-31 | End: 2024-08-02 | Stop reason: HOSPADM

## 2024-07-31 RX ORDER — OXYTOCIN-SODIUM CHLORIDE 0.9% IV SOLN 30 UNIT/500ML 30-0.9/5 UT/ML-%
95 SOLUTION INTRAVENOUS ONCE AS NEEDED
Status: DISCONTINUED | OUTPATIENT
Start: 2024-07-31 | End: 2024-08-02 | Stop reason: HOSPADM

## 2024-07-31 RX ORDER — LIDOCAINE HYDROCHLORIDE AND EPINEPHRINE 15; 5 MG/ML; UG/ML
INJECTION, SOLUTION EPIDURAL
Status: DISCONTINUED | OUTPATIENT
Start: 2024-07-31 | End: 2024-07-31

## 2024-07-31 RX ORDER — SODIUM CHLORIDE 0.9 % (FLUSH) 0.9 %
10 SYRINGE (ML) INJECTION
Status: DISCONTINUED | OUTPATIENT
Start: 2024-07-31 | End: 2024-08-02 | Stop reason: HOSPADM

## 2024-07-31 RX ORDER — SIMETHICONE 80 MG
1 TABLET,CHEWABLE ORAL EVERY 6 HOURS PRN
Status: DISCONTINUED | OUTPATIENT
Start: 2024-07-31 | End: 2024-08-02 | Stop reason: HOSPADM

## 2024-07-31 RX ORDER — PHENYLEPHRINE HYDROCHLORIDE 10 MG/ML
100 INJECTION INTRAVENOUS ONCE AS NEEDED
Status: DISCONTINUED | OUTPATIENT
Start: 2024-07-31 | End: 2024-07-31

## 2024-07-31 RX ORDER — IBUPROFEN 600 MG/1
600 TABLET ORAL EVERY 6 HOURS
Status: DISCONTINUED | OUTPATIENT
Start: 2024-08-01 | End: 2024-08-02 | Stop reason: HOSPADM

## 2024-07-31 RX ORDER — LABETALOL HYDROCHLORIDE 5 MG/ML
40 INJECTION, SOLUTION INTRAVENOUS ONCE AS NEEDED
Status: DISCONTINUED | OUTPATIENT
Start: 2024-07-31 | End: 2024-07-31

## 2024-07-31 RX ORDER — ACETAMINOPHEN 325 MG/1
650 TABLET ORAL EVERY 6 HOURS SCHEDULED
Status: DISCONTINUED | OUTPATIENT
Start: 2024-07-31 | End: 2024-08-02 | Stop reason: HOSPADM

## 2024-07-31 RX ORDER — DIPHENOXYLATE HYDROCHLORIDE AND ATROPINE SULFATE 2.5; .025 MG/1; MG/1
2 TABLET ORAL EVERY 6 HOURS PRN
Status: DISCONTINUED | OUTPATIENT
Start: 2024-07-31 | End: 2024-08-02 | Stop reason: HOSPADM

## 2024-07-31 RX ORDER — DIPHENHYDRAMINE HYDROCHLORIDE 50 MG/ML
25 INJECTION INTRAMUSCULAR; INTRAVENOUS EVERY 4 HOURS PRN
Status: DISCONTINUED | OUTPATIENT
Start: 2024-07-31 | End: 2024-08-02 | Stop reason: HOSPADM

## 2024-07-31 RX ORDER — ROPIVACAINE HYDROCHLORIDE 2 MG/ML
12 INJECTION, SOLUTION EPIDURAL; INFILTRATION CONTINUOUS
Status: DISCONTINUED | OUTPATIENT
Start: 2024-07-31 | End: 2024-07-31

## 2024-07-31 RX ORDER — OXYTOCIN-SODIUM CHLORIDE 0.9% IV SOLN 30 UNIT/500ML 30-0.9/5 UT/ML-%
10 SOLUTION INTRAVENOUS ONCE AS NEEDED
Status: DISCONTINUED | OUTPATIENT
Start: 2024-07-31 | End: 2024-08-02 | Stop reason: HOSPADM

## 2024-07-31 RX ORDER — HYDROCODONE BITARTRATE AND ACETAMINOPHEN 5; 325 MG/1; MG/1
1 TABLET ORAL EVERY 4 HOURS PRN
Status: DISCONTINUED | OUTPATIENT
Start: 2024-07-31 | End: 2024-08-02 | Stop reason: HOSPADM

## 2024-07-31 RX ADMIN — SODIUM CHLORIDE, POTASSIUM CHLORIDE, SODIUM LACTATE AND CALCIUM CHLORIDE 1000 ML: 600; 310; 30; 20 INJECTION, SOLUTION INTRAVENOUS at 02:07

## 2024-07-31 RX ADMIN — LABETALOL HYDROCHLORIDE 20 MG: 5 INJECTION INTRAVENOUS at 06:07

## 2024-07-31 RX ADMIN — LIDOCAINE HYDROCHLORIDE,EPINEPHRINE BITARTRATE 3 ML: 15; .005 INJECTION, SOLUTION EPIDURAL; INFILTRATION; INTRACAUDAL; PERINEURAL at 02:07

## 2024-07-31 RX ADMIN — SODIUM CHLORIDE, POTASSIUM CHLORIDE, SODIUM LACTATE AND CALCIUM CHLORIDE: 600; 310; 30; 20 INJECTION, SOLUTION INTRAVENOUS at 12:07

## 2024-07-31 RX ADMIN — ROPIVACAINE HYDROCHLORIDE 5 ML: 2 INJECTION, SOLUTION EPIDURAL; INFILTRATION at 03:07

## 2024-07-31 RX ADMIN — ROPIVACAINE HYDROCHLORIDE 12 ML/HR: 2 INJECTION, SOLUTION EPIDURAL; INFILTRATION at 03:07

## 2024-07-31 RX ADMIN — ONDANSETRON 4 MG: 2 INJECTION INTRAMUSCULAR; INTRAVENOUS at 01:07

## 2024-07-31 RX ADMIN — HYDROCODONE BITARTRATE AND ACETAMINOPHEN 1 TABLET: 5; 325 TABLET ORAL at 09:07

## 2024-07-31 NOTE — ANESTHESIA PREPROCEDURE EVALUATION
07/31/2024  Violeta Bradford is a 23 y.o., female.      Pre-op Assessment    I have reviewed the Patient Summary Reports.     I have reviewed the Nursing Notes. I have reviewed the NPO Status.   I have reviewed the Medications.     Review of Systems  Anesthesia Hx:             Denies Family Hx of Anesthesia complications.    Denies Personal Hx of Anesthesia complications.                    Social:  Former Smoker, Recreational Drugs       Hematology/Oncology:    Oncology Normal    -- Anemia:                                  EENT/Dental:  EENT/Dental Normal           Cardiovascular:     Hypertension                                  Hypertension         Pulmonary:  Pulmonary Normal                       Renal/:  Renal/ Normal                 Hepatic/GI:  Hepatic/GI Normal                 Musculoskeletal:  Musculoskeletal Normal                Neurological:  Neurology Normal                                      Endocrine:        Obesity / BMI > 30  Dermatological:  Skin Normal    Psych:  Psychiatric Normal                    Physical Exam  General: Well nourished, Cooperative, Alert and Oriented    Airway:  Mallampati: II   Mouth Opening: Normal  TM Distance: Normal  Tongue: Normal  Neck ROM: Normal ROM    Dental:  Intact        Anesthesia Plan  Type of Anesthesia, risks & benefits discussed:    Anesthesia Type: Epidural  Informed Consent: Informed consent signed with the Patient and all parties understand the risks and agree with anesthesia plan.  All questions answered. Patient consented to blood products? Yes  ASA Score: 2    Ready For Surgery From Anesthesia Perspective.     .

## 2024-07-31 NOTE — ANESTHESIA PROCEDURE NOTES
Epidural    Patient location during procedure: OB   Reason for block: primary anesthetic   Reason for block: labor analgesia requested by patient and obstetrician  Diagnosis: IUP Labor   Start time: 7/31/2024 2:54 AM  Timeout: 7/31/2024 2:54 AM  End time: 7/31/2024 2:59 AM    Staffing  Performing Provider: Harris Ty CRNA  Authorizing Provider: Marc Moya MD    Staffing  Performed by: Harris Ty CRNA  Authorized by: Marc Moya MD        Preanesthetic Checklist  Completed: patient identified, IV checked, risks and benefits discussed, monitors and equipment checked, pre-op evaluation, timeout performed, anesthesia consent given, hand hygiene performed and patient being monitored  Preparation  Patient position: sitting  Prep: ChloraPrep  Patient monitoring: Pulse Ox and Blood Pressure  Reason for block: primary anesthetic   Epidural  Skin Anesthetic: lidocaine 1%  Skin Wheal: 2 mL  Administration type: continuous  Approach: midline  Interspace: L4-5    Injection technique: JENNYFER air  Needle and Epidural Catheter  Needle type: Tuohy   Needle gauge: 17  Needle length: 3.5 inches  Needle insertion depth: 8 cm  Catheter type: springwound and multi-orifice  Catheter size: 19 G  Catheter at skin depth: 14 cm  Insertion Attempts: 1  Test dose: 3 mL of lidocaine 1.5% with Epi 1-to-200,000  Additional Documentation: negative aspiration for heme and CSF, incremental injection, no paresthesia on injection, no signs/symptoms of IV or SA injection, no significant complaints from patient and no significant pain on injection  Needle localization: anatomical landmarks  Assessment  Ease of block: easy  Patient's tolerance of the procedure: comfortable throughout block and no complaints No inadvertent dural puncture with Tuohy.  Dural puncture not performed with spinal needle

## 2024-08-01 PROCEDURE — 11000001 HC ACUTE MED/SURG PRIVATE ROOM

## 2024-08-01 PROCEDURE — 99231 SBSQ HOSP IP/OBS SF/LOW 25: CPT | Mod: UC,,, | Performed by: MIDWIFE

## 2024-08-01 PROCEDURE — 25000003 PHARM REV CODE 250

## 2024-08-01 RX ADMIN — IBUPROFEN 600 MG: 600 TABLET, FILM COATED ORAL at 05:08

## 2024-08-01 RX ADMIN — IBUPROFEN 600 MG: 600 TABLET, FILM COATED ORAL at 06:08

## 2024-08-01 RX ADMIN — PRENATAL VITAMINS-IRON FUMARATE 27 MG IRON-FOLIC ACID 0.8 MG TABLET 1 TABLET: at 08:08

## 2024-08-01 RX ADMIN — ACETAMINOPHEN 650 MG: 325 TABLET ORAL at 06:08

## 2024-08-01 RX ADMIN — IBUPROFEN 600 MG: 600 TABLET, FILM COATED ORAL at 12:08

## 2024-08-01 RX ADMIN — HYDROCODONE BITARTRATE AND ACETAMINOPHEN 1 TABLET: 5; 325 TABLET ORAL at 01:08

## 2024-08-01 RX ADMIN — IBUPROFEN 600 MG: 600 TABLET, FILM COATED ORAL at 01:08

## 2024-08-01 NOTE — ANESTHESIA POSTPROCEDURE EVALUATION
Anesthesia Post Evaluation    Patient: Violeta Bradford    Procedure(s) Performed: * No procedures listed *    Final Anesthesia Type: epidural      Patient location during evaluation: labor & delivery  Patient participation: Yes- Able to Participate  Level of consciousness: awake and alert and oriented  Post-procedure vital signs: reviewed and stable  Pain management: adequate  Airway patency: patent  JOJO mitigation strategies: Use of major conduction anesthesia (spinal/epidural) or peripheral nerve block  PONV status at discharge: No PONV  Anesthetic complications: no      Cardiovascular status: hemodynamically stable  Respiratory status: spontaneous ventilation and room air  Hydration status: euvolemic  Follow-up not needed.              Vitals Value Taken Time   /99 07/31/24 2003   Temp 37.1 °C (98.8 °F) 07/31/24 1752   Pulse 118 07/31/24 2003   Resp 18 07/31/24 1848   SpO2 100 % 07/2002   Vitals shown include unfiled device data.      No case tracking events are documented in the log.      Pain/Uma Score: No data recorded

## 2024-08-02 VITALS
HEART RATE: 90 BPM | OXYGEN SATURATION: 100 % | SYSTOLIC BLOOD PRESSURE: 147 MMHG | TEMPERATURE: 99 F | DIASTOLIC BLOOD PRESSURE: 78 MMHG | RESPIRATION RATE: 16 BRPM

## 2024-08-02 PROCEDURE — 25000003 PHARM REV CODE 250

## 2024-08-02 PROCEDURE — 99238 HOSP IP/OBS DSCHRG MGMT 30/<: CPT | Mod: UC,,, | Performed by: ADVANCED PRACTICE MIDWIFE

## 2024-08-02 RX ORDER — IBUPROFEN 600 MG/1
600 TABLET ORAL EVERY 6 HOURS PRN
Qty: 30 TABLET | Refills: 2 | Status: SHIPPED | OUTPATIENT
Start: 2024-08-02

## 2024-08-02 RX ADMIN — IBUPROFEN 600 MG: 600 TABLET, FILM COATED ORAL at 12:08

## 2024-08-02 RX ADMIN — HYDROCODONE BITARTRATE AND ACETAMINOPHEN 1 TABLET: 5; 325 TABLET ORAL at 10:08

## 2024-08-02 RX ADMIN — ACETAMINOPHEN 650 MG: 325 TABLET ORAL at 12:08

## 2024-08-02 RX ADMIN — IBUPROFEN 600 MG: 600 TABLET, FILM COATED ORAL at 06:08

## 2024-08-12 RX ORDER — FERROUS SULFATE 325(65) MG
TABLET, DELAYED RELEASE (ENTERIC COATED) ORAL 2 TIMES DAILY
Qty: 60 TABLET | Refills: 0 | Status: SHIPPED | OUTPATIENT
Start: 2024-08-12

## 2024-08-20 ENCOUNTER — HOSPITAL ENCOUNTER (EMERGENCY)
Facility: HOSPITAL | Age: 24
Discharge: HOME | End: 2024-08-20
Attending: EMERGENCY MEDICINE
Payer: MEDICAID

## 2024-08-20 VITALS
BODY MASS INDEX: 41.05 KG/M2 | RESPIRATION RATE: 16 BRPM | DIASTOLIC BLOOD PRESSURE: 84 MMHG | HEART RATE: 85 BPM | SYSTOLIC BLOOD PRESSURE: 132 MMHG | HEIGHT: 63 IN | TEMPERATURE: 99 F | OXYGEN SATURATION: 99 % | WEIGHT: 231.69 LBS

## 2024-08-20 DIAGNOSIS — R10.9 ABDOMINAL PAIN, UNSPECIFIED ABDOMINAL LOCATION: Primary | ICD-10-CM

## 2024-08-20 LAB
ALBUMIN SERPL BCP-MCNC: 3.3 G/DL (ref 3.5–5.2)
ALP SERPL-CCNC: 79 U/L (ref 55–135)
ALT SERPL W/O P-5'-P-CCNC: 16 U/L (ref 10–44)
ANION GAP SERPL CALC-SCNC: 10 MMOL/L (ref 8–16)
AST SERPL-CCNC: 15 U/L (ref 10–40)
B-HCG UR QL: POSITIVE
BACTERIA #/AREA URNS AUTO: ABNORMAL /HPF
BASOPHILS # BLD AUTO: 0.02 K/UL (ref 0–0.2)
BASOPHILS NFR BLD: 0.5 % (ref 0–1.9)
BILIRUB SERPL-MCNC: 0.2 MG/DL (ref 0.1–1)
BILIRUB UR QL STRIP: NEGATIVE
BUN SERPL-MCNC: 10 MG/DL (ref 6–20)
CALCIUM SERPL-MCNC: 9.5 MG/DL (ref 8.7–10.5)
CHLORIDE SERPL-SCNC: 110 MMOL/L (ref 95–110)
CLARITY UR REFRACT.AUTO: ABNORMAL
CO2 SERPL-SCNC: 20 MMOL/L (ref 23–29)
COLOR UR AUTO: YELLOW
CREAT SERPL-MCNC: 0.7 MG/DL (ref 0.5–1.4)
DIFFERENTIAL METHOD BLD: ABNORMAL
EOSINOPHIL # BLD AUTO: 0.2 K/UL (ref 0–0.5)
EOSINOPHIL NFR BLD: 4.1 % (ref 0–8)
ERYTHROCYTE [DISTWIDTH] IN BLOOD BY AUTOMATED COUNT: 12.9 % (ref 11.5–14.5)
EST. GFR  (NO RACE VARIABLE): >60 ML/MIN/1.73 M^2
GLUCOSE SERPL-MCNC: 87 MG/DL (ref 70–110)
GLUCOSE UR QL STRIP: NEGATIVE
HCG INTACT+B SERPL-ACNC: 1.8 MIU/ML
HCT VFR BLD AUTO: 39.4 % (ref 37–48.5)
HGB BLD-MCNC: 12.3 G/DL (ref 12–16)
HGB UR QL STRIP: ABNORMAL
IMM GRANULOCYTES # BLD AUTO: 0.01 K/UL (ref 0–0.04)
IMM GRANULOCYTES NFR BLD AUTO: 0.2 % (ref 0–0.5)
KETONES UR QL STRIP: NEGATIVE
LEUKOCYTE ESTERASE UR QL STRIP: ABNORMAL
LIPASE SERPL-CCNC: 11 U/L (ref 4–60)
LYMPHOCYTES # BLD AUTO: 1.4 K/UL (ref 1–4.8)
LYMPHOCYTES NFR BLD: 32.2 % (ref 18–48)
MCH RBC QN AUTO: 26.2 PG (ref 27–31)
MCHC RBC AUTO-ENTMCNC: 31.2 G/DL (ref 32–36)
MCV RBC AUTO: 84 FL (ref 82–98)
MICROSCOPIC COMMENT: ABNORMAL
MONOCYTES # BLD AUTO: 0.3 K/UL (ref 0.3–1)
MONOCYTES NFR BLD: 6.8 % (ref 4–15)
NEUTROPHILS # BLD AUTO: 2.5 K/UL (ref 1.8–7.7)
NEUTROPHILS NFR BLD: 56.2 % (ref 38–73)
NITRITE UR QL STRIP: NEGATIVE
NRBC BLD-RTO: 0 /100 WBC
PH UR STRIP: 6 [PH] (ref 5–8)
PLATELET # BLD AUTO: 474 K/UL (ref 150–450)
PMV BLD AUTO: 9.1 FL (ref 9.2–12.9)
POTASSIUM SERPL-SCNC: 4 MMOL/L (ref 3.5–5.1)
PROT SERPL-MCNC: 7.3 G/DL (ref 6–8.4)
PROT UR QL STRIP: NEGATIVE
RBC # BLD AUTO: 4.69 M/UL (ref 4–5.4)
RBC #/AREA URNS AUTO: 8 /HPF (ref 0–4)
SODIUM SERPL-SCNC: 140 MMOL/L (ref 136–145)
SP GR UR STRIP: 1.01 (ref 1–1.03)
URN SPEC COLLECT METH UR: ABNORMAL
UROBILINOGEN UR STRIP-ACNC: NEGATIVE EU/DL
WBC # BLD AUTO: 4.44 K/UL (ref 3.9–12.7)
WBC #/AREA URNS AUTO: 2 /HPF (ref 0–5)

## 2024-08-20 PROCEDURE — 81025 URINE PREGNANCY TEST: CPT | Mod: ER | Performed by: NURSE PRACTITIONER

## 2024-08-20 PROCEDURE — 80053 COMPREHEN METABOLIC PANEL: CPT | Mod: ER | Performed by: NURSE PRACTITIONER

## 2024-08-20 PROCEDURE — 81000 URINALYSIS NONAUTO W/SCOPE: CPT | Mod: ER | Performed by: NURSE PRACTITIONER

## 2024-08-20 PROCEDURE — 85025 COMPLETE CBC W/AUTO DIFF WBC: CPT | Mod: ER | Performed by: NURSE PRACTITIONER

## 2024-08-20 PROCEDURE — 25500020 PHARM REV CODE 255: Mod: ER | Performed by: NURSE PRACTITIONER

## 2024-08-20 PROCEDURE — 99285 EMERGENCY DEPT VISIT HI MDM: CPT | Mod: 25,ER

## 2024-08-20 PROCEDURE — 84702 CHORIONIC GONADOTROPIN TEST: CPT | Mod: ER | Performed by: NURSE PRACTITIONER

## 2024-08-20 PROCEDURE — 83690 ASSAY OF LIPASE: CPT | Mod: ER | Performed by: NURSE PRACTITIONER

## 2024-08-20 RX ORDER — DICYCLOMINE HYDROCHLORIDE 20 MG/1
20 TABLET ORAL EVERY 6 HOURS PRN
Qty: 20 TABLET | Refills: 0 | Status: SHIPPED | OUTPATIENT
Start: 2024-08-20 | End: 2024-08-25

## 2024-08-20 RX ADMIN — IOHEXOL 75 ML: 350 INJECTION, SOLUTION INTRAVENOUS at 06:08

## 2024-08-20 NOTE — ED PROVIDER NOTES
Encounter Date: 8/20/2024       History     Chief Complaint   Patient presents with    Abdominal Pain     Abd pain across mid abd since this am. Denies diarrhea and vomiting       Abdominal Pain  Illness onset: this morning. The problem has not changed since onset.The abdominal pain is generalized. The abdominal pain is relieved by nothing. The other symptoms of the illness do not include fever, fatigue, melena, shortness of breath, nausea, vomiting, diarrhea, dysuria, hematemesis, hematochezia, vaginal discharge or vaginal bleeding.   Pregnant now: Recent vaginal delivery 07/31/2024. Symptoms associated with the illness do not include chills, constipation, hematuria, frequency or back pain.     Review of patient's allergies indicates:   Allergen Reactions    Iodine Swelling    Shrimp Swelling     Past Medical History:   Diagnosis Date    Anemia affecting pregnancy in second trimester 2/14/2024     History reviewed. No pertinent surgical history.  Family History   Problem Relation Name Age of Onset    Hypertension Mother       Social History     Tobacco Use    Smoking status: Former     Types: Vaping with nicotine    Smokeless tobacco: Never   Substance Use Topics    Alcohol use: Yes     Comment: occassionally    Drug use: Never     Review of Systems   Constitutional:  Negative for chills, fatigue and fever.   Respiratory:  Negative for cough and shortness of breath.    Cardiovascular:  Negative for chest pain.   Gastrointestinal:  Positive for abdominal pain. Negative for constipation, diarrhea, hematemesis, hematochezia, melena, nausea and vomiting.   Genitourinary:  Negative for dysuria, frequency, hematuria, vaginal bleeding and vaginal discharge.   Musculoskeletal:  Negative for back pain, myalgias and neck pain.   Skin:  Negative for rash.   Neurological:  Negative for weakness and headaches.   All other systems reviewed and are negative.      Physical Exam     Initial Vitals   BP Pulse Resp Temp SpO2    08/20/24 1607 08/20/24 1549 08/20/24 1549 08/20/24 1549 08/20/24 1549   (!) 152/99 83 20 98.5 °F (36.9 °C) 100 %      MAP       --                Physical Exam    Nursing note and vitals reviewed.  Constitutional: She is not diaphoretic. She is cooperative.  Non-toxic appearance. No distress.   HENT:   Head: Normocephalic and atraumatic.   Eyes: Conjunctivae are normal.   Neck: Neck supple.   Normal range of motion.  Cardiovascular:  Normal rate and regular rhythm.           Pulmonary/Chest: Breath sounds normal. No respiratory distress.   Abdominal: Abdomen is soft. There is abdominal tenderness (Generalized abdominal tenderness to all 4 quadrants.).   No CVA tenderness.   Musculoskeletal:         General: Normal range of motion.      Cervical back: Normal range of motion and neck supple.     Neurological: She is alert and oriented to person, place, and time. She has normal strength. GCS score is 15. GCS eye subscore is 4. GCS verbal subscore is 5. GCS motor subscore is 6.   Skin: Skin is warm and dry.         ED Course   Procedures  Labs Reviewed   CBC W/ AUTO DIFFERENTIAL - Abnormal       Result Value    WBC 4.44      RBC 4.69      Hemoglobin 12.3      Hematocrit 39.4      MCV 84      MCH 26.2 (*)     MCHC 31.2 (*)     RDW 12.9      Platelets 474 (*)     MPV 9.1 (*)     Immature Granulocytes 0.2      Gran # (ANC) 2.5      Immature Grans (Abs) 0.01      Lymph # 1.4      Mono # 0.3      Eos # 0.2      Baso # 0.02      nRBC 0      Gran % 56.2      Lymph % 32.2      Mono % 6.8      Eosinophil % 4.1      Basophil % 0.5      Differential Method Automated     COMPREHENSIVE METABOLIC PANEL - Abnormal    Sodium 140      Potassium 4.0      Chloride 110      CO2 20 (*)     Glucose 87      BUN 10      Creatinine 0.7      Calcium 9.5      Total Protein 7.3      Albumin 3.3 (*)     Total Bilirubin 0.2      Alkaline Phosphatase 79      AST 15      ALT 16      eGFR >60.0      Anion Gap 10     URINALYSIS, REFLEX TO URINE  CULTURE - Abnormal    Specimen UA Urine, Clean Catch      Color, UA Yellow      Appearance, UA Hazy (*)     pH, UA 6.0      Specific Gravity, UA 1.015      Protein, UA Negative      Glucose, UA Negative      Ketones, UA Negative      Bilirubin (UA) Negative      Occult Blood UA 2+ (*)     Nitrite, UA Negative      Urobilinogen, UA Negative      Leukocytes, UA 1+ (*)     Narrative:     Specimen Source->Urine   PREGNANCY TEST, URINE RAPID - Abnormal    Preg Test, Ur Positive (*)     Narrative:     Specimen Source->Urine   URINALYSIS MICROSCOPIC - Abnormal    RBC, UA 8 (*)     WBC, UA 2      Bacteria Few (*)     Microscopic Comment SEE COMMENT      Narrative:     Specimen Source->Urine   LIPASE    Lipase 11     HCG, QUANTITATIVE   HCG, QUANTITATIVE    HCG Quant 1.8            Imaging Results              CT Abdomen Pelvis With IV Contrast NO Oral Contrast (Final result)  Result time 08/20/24 18:46:42      Final result by Katelynn Byrd MD (08/20/24 18:46:42)                   Impression:      No acute abdominopelvic finding      Electronically signed by: Katelynn Byrd  Date:    08/20/2024  Time:    18:46               Narrative:    EXAMINATION:  CT ABDOMEN PELVIS WITH IV CONTRAST    CLINICAL HISTORY:  Abdominal pain, acute, nonlocalized;    TECHNIQUE:  Low dose axial images, sagittal and coronal reformations were obtained from the lung bases to the pubic symphysis following the IV administration of 100 mL of Omnipaque 350 iterative technique automated exposure control    COMPARISON:  09/11/2023    FINDINGS:  Liver spleen and pancreas stable.  Gallbladder present    Kidneys without hydronephrosis stable    Urinary bladder decompressed    No obstructive bowel findings.  No signs of appendicitis.    Scant ascites                                       Medications   iohexoL (OMNIPAQUE 350) injection 75 mL (75 mLs Intravenous Given 8/20/24 1823)     Medical Decision Making  Amount and/or Complexity of Data  Reviewed  Labs: ordered.  Radiology: ordered.    Risk  Prescription drug management.       Chart review reveals patient has had multiple CT with contrast in the past. questioned patient, she states she has not had any complication with IV contrast in the past.                    Upon reassessment of patient, she reports symptoms have improved throughout ER visit.  She denies abdominal pain at the time of discharge.  Denies any other symptoms at this time.  States she is established with Gastroenterology, discussed outpatient follow-up with her GI provider.  She is nontoxic/non ill-appearing.  She denies any urinary complaints.  Denies dysuria, urinary frequency.  She is in no acute distress.  Discussed signs symptoms to return to ER.  Dicyclomine Rx provided, patient denies breastfeeding.  Patient agrees with plan and voiced no further concerns.    I discussed with patient and family  that evaluation in the ED does not suggest any emergent or life threatening medical conditions requiring immediate intervention beyond what was provided in the ED, and I believe patient is safe for discharge. Regardless, an unremarkable evaluation in the ED does not preclude the development or presence of a serious of life threatening condition. As such, patient was instructed to return immediately for any worsening or change in current symptoms.             Clinical Impression:  Final diagnoses:  [R10.9] Abdominal pain, unspecified abdominal location (Primary)          ED Disposition Condition    Discharge Stable          ED Prescriptions       Medication Sig Dispense Start Date End Date Auth. Provider    dicyclomine (BENTYL) 20 mg tablet Take 1 tablet (20 mg total) by mouth every 6 (six) hours as needed (abdominal pain). 20 tablet 8/20/2024 8/25/2024 Micha Reyes NP          Follow-up Information       Follow up With Specialties Details Why Contact Info    Follow-up with your gastroenterologist in 2 days.        Mon[facility]  Emergency Dept Emergency Medicine  As needed, If symptoms worsen [address]  Emergency Department  [facility] [id number] [phone]             Micha Reyes, NP  08/20/24 1918

## 2024-08-26 ENCOUNTER — TELEPHONE (OUTPATIENT)
Dept: OBSTETRICS AND GYNECOLOGY | Facility: CLINIC | Age: 24
End: 2024-08-26
Payer: MEDICAID

## 2024-08-26 DIAGNOSIS — K31.84 GASTROPARESIS: Primary | ICD-10-CM

## 2024-08-26 RX ORDER — PANTOPRAZOLE SODIUM 40 MG/1
40 TABLET, DELAYED RELEASE ORAL DAILY
Qty: 30 TABLET | Refills: 1 | Status: SHIPPED | OUTPATIENT
Start: 2024-08-26

## 2024-08-26 NOTE — TELEPHONE ENCOUNTER
Called patient to inform her that her prescriptions was resent and should be able to . Patient did not answer and I left a voice message for her.

## 2024-08-26 NOTE — TELEPHONE ENCOUNTER
----- Message from Latrice Morales sent at 8/26/2024  8:41 AM CDT -----  Regarding: rx concerns  Name of who is calling:   mom /Ambreen      What is the request in detail: Requesting a call back in ref to rx for protonix needs to be for once per day 40 mgs in order for ins to cover      Can the clinic reply by [facility]:yes      What number to call back if not [facility]: [phone]

## 2024-08-26 NOTE — TELEPHONE ENCOUNTER
Attempted to rtn call to pt mom, x2  phone call not going through       Brenda MAYER LPN  OB/GYN

## 2024-09-05 ENCOUNTER — POSTPARTUM VISIT (OUTPATIENT)
Dept: OBSTETRICS AND GYNECOLOGY | Facility: CLINIC | Age: 24
End: 2024-09-05
Payer: MEDICAID

## 2024-09-05 VITALS
WEIGHT: 235.88 LBS | BODY MASS INDEX: 41.79 KG/M2 | HEIGHT: 63 IN | DIASTOLIC BLOOD PRESSURE: 68 MMHG | SYSTOLIC BLOOD PRESSURE: 116 MMHG

## 2024-09-05 PROCEDURE — 99213 OFFICE O/P EST LOW 20 MIN: CPT | Mod: PBBFAC

## 2024-09-05 PROCEDURE — 99999 PR PBB SHADOW E&M-EST. PATIENT-LVL III: CPT | Mod: PBBFAC,,,

## 2024-09-12 RX ORDER — FERROUS SULFATE 325(65) MG
TABLET, DELAYED RELEASE (ENTERIC COATED) ORAL 2 TIMES DAILY
Qty: 60 TABLET | Refills: 0 | Status: SHIPPED | OUTPATIENT
Start: 2024-09-12

## 2024-09-12 NOTE — PROGRESS NOTES
"  Subjective:       Violtea Bradford is a 23 y.o. female who presents for a postpartum visit. She is 5 weeks postpartum following a spontaneous vaginal delivery. I have fully reviewed the prenatal and intrapartum course. The delivery was at 39.2 gestational weeks. Outcome: spontaneous vaginal delivery. Anesthesia: epidural. Postpartum course has been uncomplicated. Baby's course has been uncomplicated. Baby is feeding by bottle. Bleeding  currently on cycle with a heavy flow today . Bowel function is normal. Bladder function is normal. Patient is not sexually active. Contraception method is none. Postpartum depression screening: negative.    The following portions of the patient's history were reviewed and updated as appropriate: allergies, current medications, past family history, past medical history, past social history, past surgical history, and problem list.    Review of Systems  Pertinent items are noted in HPI.     Objective:      /68   Ht 5' 3" (1.6 m)   Wt 107 kg (235 lb 14.3 oz)   LMP 11/30/2023 (Approximate) Comment: delivery on 7/31/24  Breastfeeding No   BMI 41.79 kg/m²    General:  alert, appears stated age, and cooperative                    Vulva:  normal, right labial laceration well approximated without evidence of infection   Vagina: normal vagina               Rectal Exam: Not performed.          Assessment:      routine postpartum exam. Pap smear declined at today's visit due to bleeding.     Plan:     Encounter for postpartum visit  - RTC when cycle is over for pap  - does not desires contraception. Reports she will abstain      "

## 2025-06-04 ENCOUNTER — HOSPITAL ENCOUNTER (EMERGENCY)
Facility: HOSPITAL | Age: 25
Discharge: HOME | End: 2025-06-04
Attending: EMERGENCY MEDICINE
Payer: MEDICAID

## 2025-06-04 VITALS
SYSTOLIC BLOOD PRESSURE: 146 MMHG | HEIGHT: 63 IN | DIASTOLIC BLOOD PRESSURE: 85 MMHG | RESPIRATION RATE: 19 BRPM | BODY MASS INDEX: 42.52 KG/M2 | TEMPERATURE: 98 F | HEART RATE: 70 BPM | OXYGEN SATURATION: 99 % | WEIGHT: 240 LBS

## 2025-06-04 DIAGNOSIS — R11.2 NAUSEA AND VOMITING, UNSPECIFIED VOMITING TYPE: ICD-10-CM

## 2025-06-04 DIAGNOSIS — R10.10 PAIN OF UPPER ABDOMEN: Primary | ICD-10-CM

## 2025-06-04 LAB
ABSOLUTE EOSINOPHIL (OHS): 0 K/UL
ABSOLUTE MONOCYTE (OHS): 0.58 K/UL (ref 0.3–1)
ABSOLUTE NEUTROPHIL COUNT (OHS): 7.19 K/UL (ref 1.8–7.7)
ALBUMIN SERPL BCP-MCNC: 4 G/DL (ref 3.5–5.2)
ALP SERPL-CCNC: 65 UNIT/L (ref 40–150)
ALT SERPL W/O P-5'-P-CCNC: 14 UNIT/L (ref 10–44)
ANION GAP (OHS): 12 MMOL/L (ref 8–16)
AST SERPL-CCNC: 13 UNIT/L (ref 11–45)
B-HCG UR QL: NEGATIVE
BACTERIA #/AREA URNS HPF: ABNORMAL /HPF
BASOPHILS # BLD AUTO: 0.03 K/UL
BASOPHILS NFR BLD AUTO: 0.3 %
BILIRUB SERPL-MCNC: 0.3 MG/DL (ref 0.1–1)
BILIRUB UR QL STRIP.AUTO: ABNORMAL
BUN SERPL-MCNC: 15 MG/DL (ref 6–20)
CALCIUM SERPL-MCNC: 9.5 MG/DL (ref 8.7–10.5)
CHLORIDE SERPL-SCNC: 107 MMOL/L (ref 95–110)
CLARITY UR: ABNORMAL
CO2 SERPL-SCNC: 20 MMOL/L (ref 23–29)
COLOR UR AUTO: ABNORMAL
CREAT SERPL-MCNC: 0.7 MG/DL (ref 0.5–1.4)
ERYTHROCYTE [DISTWIDTH] IN BLOOD BY AUTOMATED COUNT: 15.6 % (ref 11.5–14.5)
GFR SERPLBLD CREATININE-BSD FMLA CKD-EPI: >60 ML/MIN/1.73/M2
GLUCOSE SERPL-MCNC: 104 MG/DL (ref 70–110)
GLUCOSE UR QL STRIP: NEGATIVE
HCT VFR BLD AUTO: 36.6 % (ref 37–48.5)
HGB BLD-MCNC: 11.8 GM/DL (ref 12–16)
HGB UR QL STRIP: ABNORMAL
HOLD SPECIMEN: NORMAL
HOLD SPECIMEN: NORMAL
HYALINE CASTS #/AREA URNS LPF: 0 /LPF (ref 0–1)
IMM GRANULOCYTES # BLD AUTO: 0.13 K/UL (ref 0–0.04)
IMM GRANULOCYTES NFR BLD AUTO: 1.3 % (ref 0–0.5)
KETONES UR QL STRIP: ABNORMAL
LEUKOCYTE ESTERASE UR QL STRIP: ABNORMAL
LIPASE SERPL-CCNC: 10 U/L (ref 4–60)
LYMPHOCYTES # BLD AUTO: 2.12 K/UL (ref 1–4.8)
MCH RBC QN AUTO: 25.4 PG (ref 27–31)
MCHC RBC AUTO-ENTMCNC: 32.2 G/DL (ref 32–36)
MCV RBC AUTO: 79 FL (ref 82–98)
MICROSCOPIC COMMENT: ABNORMAL
NITRITE UR QL STRIP: NEGATIVE
NUCLEATED RBC (/100WBC) (OHS): 0 /100 WBC
PH UR STRIP: 7 [PH]
PLATELET # BLD AUTO: 502 K/UL (ref 150–450)
PMV BLD AUTO: 9.1 FL (ref 9.2–12.9)
POTASSIUM SERPL-SCNC: 3.3 MMOL/L (ref 3.5–5.1)
PROT SERPL-MCNC: 8.9 GM/DL (ref 6–8.4)
PROT UR QL STRIP: ABNORMAL
RBC # BLD AUTO: 4.64 M/UL (ref 4–5.4)
RBC #/AREA URNS HPF: 35 /HPF (ref 0–4)
RELATIVE EOSINOPHIL (OHS): 0 %
RELATIVE LYMPHOCYTE (OHS): 21.1 % (ref 18–48)
RELATIVE MONOCYTE (OHS): 5.8 % (ref 4–15)
RELATIVE NEUTROPHIL (OHS): 71.5 % (ref 38–73)
SODIUM SERPL-SCNC: 139 MMOL/L (ref 136–145)
SP GR UR STRIP: 1.01
UROBILINOGEN UR STRIP-ACNC: NEGATIVE EU/DL
WBC # BLD AUTO: 10.05 K/UL (ref 3.9–12.7)
WBC #/AREA URNS HPF: 1 /HPF (ref 0–5)

## 2025-06-04 PROCEDURE — 85025 COMPLETE CBC W/AUTO DIFF WBC: CPT | Mod: ER | Performed by: NURSE PRACTITIONER

## 2025-06-04 PROCEDURE — 96374 THER/PROPH/DIAG INJ IV PUSH: CPT | Mod: ER

## 2025-06-04 PROCEDURE — 25500020 PHARM REV CODE 255: Mod: ER | Performed by: NURSE PRACTITIONER

## 2025-06-04 PROCEDURE — 80053 COMPREHEN METABOLIC PANEL: CPT | Mod: ER | Performed by: NURSE PRACTITIONER

## 2025-06-04 PROCEDURE — 63600175 PHARM REV CODE 636 W HCPCS: Mod: ER | Performed by: NURSE PRACTITIONER

## 2025-06-04 PROCEDURE — 99285 EMERGENCY DEPT VISIT HI MDM: CPT | Mod: 25,ER

## 2025-06-04 PROCEDURE — 96361 HYDRATE IV INFUSION ADD-ON: CPT | Mod: ER

## 2025-06-04 PROCEDURE — 81003 URINALYSIS AUTO W/O SCOPE: CPT | Mod: ER | Performed by: NURSE PRACTITIONER

## 2025-06-04 PROCEDURE — 86803 HEPATITIS C AB TEST: CPT | Performed by: NURSE PRACTITIONER

## 2025-06-04 PROCEDURE — 81025 URINE PREGNANCY TEST: CPT | Mod: ER | Performed by: NURSE PRACTITIONER

## 2025-06-04 PROCEDURE — 83690 ASSAY OF LIPASE: CPT | Mod: ER | Performed by: NURSE PRACTITIONER

## 2025-06-04 PROCEDURE — 96375 TX/PRO/DX INJ NEW DRUG ADDON: CPT | Mod: ER

## 2025-06-04 PROCEDURE — 25000003 PHARM REV CODE 250: Mod: ER | Performed by: NURSE PRACTITIONER

## 2025-06-04 PROCEDURE — 87389 HIV-1 AG W/HIV-1&-2 AB AG IA: CPT | Performed by: NURSE PRACTITIONER

## 2025-06-04 RX ORDER — ONDANSETRON 4 MG/1
4 TABLET, FILM COATED ORAL EVERY 8 HOURS PRN
Qty: 12 TABLET | Refills: 0 | Status: SHIPPED | OUTPATIENT
Start: 2025-06-04 | End: 2025-06-08

## 2025-06-04 RX ORDER — MORPHINE SULFATE 4 MG/ML
4 INJECTION, SOLUTION INTRAMUSCULAR; INTRAVENOUS
Refills: 0 | Status: COMPLETED | OUTPATIENT
Start: 2025-06-04 | End: 2025-06-04

## 2025-06-04 RX ORDER — ONDANSETRON HYDROCHLORIDE 2 MG/ML
4 INJECTION, SOLUTION INTRAVENOUS
Status: COMPLETED | OUTPATIENT
Start: 2025-06-04 | End: 2025-06-04

## 2025-06-04 RX ORDER — NAPROXEN 500 MG/1
500 TABLET ORAL 2 TIMES DAILY WITH MEALS
Qty: 10 TABLET | Refills: 0 | Status: SHIPPED | OUTPATIENT
Start: 2025-06-04 | End: 2025-06-04 | Stop reason: CLARIF

## 2025-06-04 RX ORDER — ONDANSETRON 4 MG/1
4 TABLET, FILM COATED ORAL EVERY 8 HOURS PRN
Qty: 12 TABLET | Refills: 0 | Status: SHIPPED | OUTPATIENT
Start: 2025-06-04 | End: 2025-06-04

## 2025-06-04 RX ADMIN — MORPHINE SULFATE 4 MG: 4 INJECTION INTRAVENOUS at 03:06

## 2025-06-04 RX ADMIN — SODIUM CHLORIDE 1000 ML: 9 INJECTION, SOLUTION INTRAVENOUS at 03:06

## 2025-06-04 RX ADMIN — IOHEXOL 100 ML: 350 INJECTION, SOLUTION INTRAVENOUS at 04:06

## 2025-06-04 RX ADMIN — ONDANSETRON 4 MG: 2 INJECTION INTRAMUSCULAR; INTRAVENOUS at 03:06

## 2025-06-04 NOTE — ED PROVIDER NOTES
Encounter Date: 6/4/2025       History     Chief Complaint   Patient presents with    Abdominal Pain     Hx gastroparesis. Vomiting. No BM since Saturday.      The history is provided by the patient.   Abdominal Pain  The current episode started two days ago. The problem has not changed since onset.The abdominal pain is located in the RUQ, LUQ and epigastric region. The abdominal pain does not radiate. The abdominal pain is relieved by nothing. The abdominal pain is exacerbated by vomiting. The other symptoms of the illness include nausea and vomiting. The other symptoms of the illness do not include fever, fatigue, shortness of breath, diarrhea, dysuria, hematemesis or hematochezia.   The patient has had a change in bowel habit (Patient reports constipation, last bowel movement 4 days ago.). Additional symptoms associated with the illness include constipation. Symptoms associated with the illness do not include chills, urgency, hematuria, frequency or back pain.     Review of patient's allergies indicates:   Allergen Reactions    Iodine Swelling    Shrimp Swelling     Past Medical History:   Diagnosis Date    Anemia affecting pregnancy in second trimester 2/14/2024     No past surgical history on file.  Family History   Problem Relation Name Age of Onset    Hypertension Mother       Social History[1]  Review of Systems   Constitutional:  Negative for chills, fatigue and fever.   Respiratory:  Negative for cough and shortness of breath.    Cardiovascular:  Negative for chest pain.   Gastrointestinal:  Positive for abdominal pain, constipation, nausea and vomiting. Negative for diarrhea, hematemesis and hematochezia.   Genitourinary:  Negative for dysuria, frequency, hematuria and urgency.   Musculoskeletal:  Negative for back pain, myalgias and neck pain.   Skin:  Negative for rash.   Neurological:  Negative for weakness and headaches.   All other systems reviewed and are negative.      Physical Exam     Initial  Vitals [06/04/25 1505]   BP Pulse Resp Temp SpO2   (!) 142/84 97 16 98.1 °F (36.7 °C) 100 %      MAP       --         Physical Exam    Nursing note and vitals reviewed.  Constitutional: She is not diaphoretic. She is cooperative.  Non-toxic appearance. She does not have a sickly appearance. No distress.   HENT:   Head: Normocephalic and atraumatic. Mouth/Throat: Oropharynx is clear and moist.   Eyes: Conjunctivae are normal.   Neck: Neck supple.   Normal range of motion.  Cardiovascular:  Normal rate and regular rhythm.           Pulmonary/Chest: Breath sounds normal. No respiratory distress.   Abdominal: Abdomen is soft. There is abdominal tenderness (Generalized upper abdominal tenderness.).   Musculoskeletal:         General: Normal range of motion.      Cervical back: Normal range of motion and neck supple.     Neurological: She is alert and oriented to person, place, and time. GCS score is 15. GCS eye subscore is 4. GCS verbal subscore is 5. GCS motor subscore is 6.   Skin: Skin is warm and dry.         ED Course   Procedures  Labs Reviewed   COMPREHENSIVE METABOLIC PANEL - Abnormal       Result Value    Sodium 139      Potassium 3.3 (*)     Chloride 107      CO2 20 (*)     Glucose 104      BUN 15      Creatinine 0.7      Calcium 9.5      Protein Total 8.9 (*)     Albumin 4.0      Bilirubin Total 0.3      ALP 65      AST 13      ALT 14      Anion Gap 12      eGFR >60     URINALYSIS, REFLEX TO URINE CULTURE - Abnormal    Color, UA Other (*)     Appearance, UA Hazy (*)     pH, UA 7.0      Spec Grav UA 1.015      Protein, UA 2+ (*)     Glucose, UA Negative      Ketones, UA 1+ (*)     Bilirubin, UA 1+ (*)     Blood, UA 3+ (*)     Nitrites, UA Negative      Urobilinogen, UA Negative      Leukocyte Esterase, UA Trace (*)    CBC WITH DIFFERENTIAL - Abnormal    WBC 10.05      RBC 4.64      HGB 11.8 (*)     HCT 36.6 (*)     MCV 79 (*)     MCH 25.4 (*)     MCHC 32.2      RDW 15.6 (*)     Platelet Count 502 (*)     MPV  9.1 (*)     Nucleated RBC 0      Neut % 71.5      Lymph % 21.1      Mono % 5.8      Eos % 0.0      Basophil % 0.3      Imm Grans % 1.3 (*)     Neut # 7.19      Lymph # 2.12      Mono # 0.58      Eos # 0.00      Baso # 0.03      Imm Grans # 0.13 (*)    URINALYSIS MICROSCOPIC - Abnormal    RBC, UA 35 (*)     WBC, UA 1      Bacteria, UA Occasional      Hyaline Casts, UA 0      Microscopic Comment       LIPASE - Normal    Lipase Level 10     PREGNANCY TEST, URINE RAPID - Normal    hCG Qualitative, Urine Negative     HEP C VIRUS HOLD SPECIMEN    Extra Tube Hold for add-ons.     CBC W/ AUTO DIFFERENTIAL    Narrative:     The following orders were created for panel order CBC auto differential.  Procedure                               Abnormality         Status                     ---------                               -----------         ------                     CBC with Differential[[id number]]       Abnormal            Final result                 Please view results for these tests on the individual orders.   GREY TOP URINE HOLD    Extra Tube Hold for add-ons.     HEPATITIS C ANTIBODY   HIV 1 / 2 ANTIBODY          Imaging Results              CT Abdomen Pelvis With IV Contrast NO Oral Contrast (Final result)  Result time 06/04/25 16:52:57      Final result by Papito Jameson DO (06/04/25 16:52:57)                   Impression:     No acute findings.    All CT scans at [this location] are performed using dose modulation techniques as appropriate to a performed exam including the following:  Automated exposure control; adjustment of the mA and/or kV according to patient size (this includes techniques or standardized protocols for targeted exams where dose is matched to indication / reason for exam; i.e. extremities or head); use of iterative reconstruction technique.    Finalized on: 6/4/2025 4:52 PM By:  Papito Jameson  [facility]# [id number]      2025-06-04 16:55:02.247     [facility]               Narrative:    EXAM: CT ABDOMEN  PELVIS WITH IV CONTRAST    CLINICAL HISTORY: Epigastric pain and vomiting    COMPARISON: None    TECHNIQUE: Standard thin-section axial images, with reformatted sagittal and coronal images.    FINDINGS: Lung bases are clear.    Liver, spleen, pancreas, adrenals, gallbladder, kidneys and ureters are unremarkable.    Questional 2.4 cm lipoma third portion of the duodenum.  No dilated loops of large or small bowel.  No evidence for appendicitis.    Vascular structures are unremarkable.  No abdominal or retroperitoneal lymphadenopathy.  No ascites or fat stranding within the abdomen.    Pelvis: Uterus and adnexal regions are unremarkable.  No pelvic free fluid, iliac chain or inguinal lymphadenopathy.    No concerning lytic or sclerotic bony lesions.                                         Medications   ondansetron injection 4 mg (4 mg Intravenous Given 6/4/25 1536)   morphine injection 4 mg (4 mg Intravenous Given 6/4/25 1536)   sodium chloride 0.9% bolus 1,000 mL 1,000 mL (0 mLs Intravenous Stopped 6/4/25 1607)   iohexoL (OMNIPAQUE 350) injection 100 mL (100 mLs Intravenous Given 6/4/25 1627)     Medical Decision Making  Amount and/or Complexity of Data Reviewed  Labs: ordered.  Radiology: ordered.    Risk  Prescription drug management.                Upon reassessment of patient, she reports significant improvement of symptoms after medication administration.  She is nontoxic/non ill-appearing.  Vital signs stable.  She is in no acute distress.  Zofran Rx provided.  She states she is established with GI services, instructed patient to follow-up with her GI specialist.  Discussed signs symptoms to return to ER.  Patient agrees with plan and voiced no further concerns.    I discussed with patient  that evaluation in the ED does not suggest any emergent or life threatening medical conditions requiring immediate intervention beyond what was provided in the ED, and I believe patient is safe for discharge. Regardless, an  unremarkable evaluation in the ED does not preclude the development or presence of a serious of life threatening condition. As such, patient was instructed to return immediately for any worsening or change in current symptoms.                        Clinical Impression:  Final diagnoses:  [R10.10] Pain of upper abdomen (Primary)  [R11.2] Nausea and vomiting, unspecified vomiting type          ED Disposition Condition    Discharge Stable          ED Prescriptions       Medication Sig Dispense Start Date End Date Auth. Provider    ondansetron (ZOFRAN) 4 MG tablet  (Status: Discontinued) Take 1 tablet (4 mg total) by mouth every 8 (eight) hours as needed for Nausea. 12 tablet 6/4/2025 6/4/2025 Micha Reyes NP    naproxen (NAPROSYN) 500 MG tablet  (Status: Discontinued) Take 1 tablet (500 mg total) by mouth 2 (two) times daily with meals. for 5 days 10 tablet 6/4/2025 6/4/2025 Micha Reyes NP    ondansetron (ZOFRAN) 4 MG tablet Take 1 tablet (4 mg total) by mouth every 8 (eight) hours as needed for Nausea. 12 tablet 6/4/2025 6/8/2025 Micha Reyes NP          Follow-up Information       Follow up With Specialties Details Why Contact Info    Follow-up with your GI specialist in 2 days.        [place] - Emergency Dept Emergency Medicine  As needed, If symptoms worsen [address]  Emergency Department  [facility] [id number] [phone]                   [1]   Social History  Tobacco Use    Smoking status: Former     Types: Vaping with nicotine    Smokeless tobacco: Never   Substance Use Topics    Alcohol use: Not Currently     Comment: occassionally    Drug use: Never        Micha Reyes NP  06/04/25 1937

## 2025-06-05 LAB
HCV AB SERPL QL IA: NEGATIVE
HIV 1+2 AB+HIV1 P24 AG SERPL QL IA: NEGATIVE